# Patient Record
Sex: MALE | Race: WHITE | NOT HISPANIC OR LATINO | Employment: OTHER | URBAN - METROPOLITAN AREA
[De-identification: names, ages, dates, MRNs, and addresses within clinical notes are randomized per-mention and may not be internally consistent; named-entity substitution may affect disease eponyms.]

---

## 2020-06-30 ENCOUNTER — HOSPITAL ENCOUNTER (EMERGENCY)
Facility: HOSPITAL | Age: 50
Discharge: HOME | End: 2020-06-30
Attending: EMERGENCY MEDICINE
Payer: MEDICAID

## 2020-06-30 VITALS
WEIGHT: 150 LBS | TEMPERATURE: 98 F | HEIGHT: 68 IN | HEART RATE: 78 BPM | SYSTOLIC BLOOD PRESSURE: 128 MMHG | RESPIRATION RATE: 18 BRPM | BODY MASS INDEX: 22.73 KG/M2 | OXYGEN SATURATION: 99 % | DIASTOLIC BLOOD PRESSURE: 78 MMHG

## 2020-06-30 DIAGNOSIS — R20.2 PARESTHESIAS: Primary | ICD-10-CM

## 2020-06-30 DIAGNOSIS — M79.2 NEUROPATHIC PAIN: ICD-10-CM

## 2020-06-30 LAB
ALBUMIN SERPL BCP-MCNC: 4 G/DL (ref 3.5–5.2)
ALP SERPL-CCNC: 81 U/L (ref 55–135)
ALT SERPL W/O P-5'-P-CCNC: 18 U/L (ref 10–44)
ANION GAP SERPL CALC-SCNC: 8 MMOL/L (ref 8–16)
AST SERPL-CCNC: 17 U/L (ref 10–40)
BASOPHILS # BLD AUTO: 0.06 K/UL (ref 0–0.2)
BASOPHILS NFR BLD: 0.7 % (ref 0–1.9)
BILIRUB SERPL-MCNC: 1.1 MG/DL (ref 0.1–1)
BUN SERPL-MCNC: 17 MG/DL (ref 6–20)
CALCIUM SERPL-MCNC: 9.1 MG/DL (ref 8.7–10.5)
CHLORIDE SERPL-SCNC: 108 MMOL/L (ref 95–110)
CO2 SERPL-SCNC: 25 MMOL/L (ref 23–29)
CREAT SERPL-MCNC: 0.8 MG/DL (ref 0.5–1.4)
DIFFERENTIAL METHOD: ABNORMAL
EOSINOPHIL # BLD AUTO: 0.2 K/UL (ref 0–0.5)
EOSINOPHIL NFR BLD: 2.1 % (ref 0–8)
ERYTHROCYTE [DISTWIDTH] IN BLOOD BY AUTOMATED COUNT: 12.6 % (ref 11.5–14.5)
EST. GFR  (AFRICAN AMERICAN): >60 ML/MIN/1.73 M^2
EST. GFR  (NON AFRICAN AMERICAN): >60 ML/MIN/1.73 M^2
GLUCOSE SERPL-MCNC: 76 MG/DL (ref 70–110)
HCT VFR BLD AUTO: 46.5 % (ref 40–54)
HGB BLD-MCNC: 15.6 G/DL (ref 14–18)
IMM GRANULOCYTES # BLD AUTO: 0.02 K/UL (ref 0–0.04)
IMM GRANULOCYTES NFR BLD AUTO: 0.2 % (ref 0–0.5)
LACTATE SERPL-SCNC: 1.2 MMOL/L (ref 0.5–2.2)
LYMPHOCYTES # BLD AUTO: 2.7 K/UL (ref 1–4.8)
LYMPHOCYTES NFR BLD: 31.1 % (ref 18–48)
MCH RBC QN AUTO: 32 PG (ref 27–31)
MCHC RBC AUTO-ENTMCNC: 33.5 G/DL (ref 32–36)
MCV RBC AUTO: 95 FL (ref 82–98)
MONOCYTES # BLD AUTO: 0.8 K/UL (ref 0.3–1)
MONOCYTES NFR BLD: 8.9 % (ref 4–15)
NEUTROPHILS # BLD AUTO: 4.9 K/UL (ref 1.8–7.7)
NEUTROPHILS NFR BLD: 57 % (ref 38–73)
NRBC BLD-RTO: 0 /100 WBC
PLATELET # BLD AUTO: 266 K/UL (ref 150–350)
PMV BLD AUTO: 9.8 FL (ref 9.2–12.9)
POTASSIUM SERPL-SCNC: 4.2 MMOL/L (ref 3.5–5.1)
PROT SERPL-MCNC: 7 G/DL (ref 6–8.4)
RBC # BLD AUTO: 4.88 M/UL (ref 4.6–6.2)
SODIUM SERPL-SCNC: 141 MMOL/L (ref 136–145)
WBC # BLD AUTO: 8.62 K/UL (ref 3.9–12.7)

## 2020-06-30 PROCEDURE — 99284 EMERGENCY DEPT VISIT MOD MDM: CPT | Mod: ,,, | Performed by: EMERGENCY MEDICINE

## 2020-06-30 PROCEDURE — 83605 ASSAY OF LACTIC ACID: CPT

## 2020-06-30 PROCEDURE — 99284 EMERGENCY DEPT VISIT MOD MDM: CPT | Mod: 25

## 2020-06-30 PROCEDURE — 99284 PR EMERGENCY DEPT VISIT,LEVEL IV: ICD-10-PCS | Mod: ,,, | Performed by: EMERGENCY MEDICINE

## 2020-06-30 PROCEDURE — 80053 COMPREHEN METABOLIC PANEL: CPT

## 2020-06-30 PROCEDURE — 85025 COMPLETE CBC W/AUTO DIFF WBC: CPT

## 2020-06-30 RX ORDER — PREGABALIN 25 MG/1
25 CAPSULE ORAL 2 TIMES DAILY
Qty: 60 CAPSULE | Refills: 0 | Status: SHIPPED | OUTPATIENT
Start: 2020-06-30 | End: 2021-05-04 | Stop reason: CLARIF

## 2020-06-30 RX ORDER — GABAPENTIN 100 MG/1
100 CAPSULE ORAL 3 TIMES DAILY
Qty: 90 CAPSULE | Refills: 0 | Status: SHIPPED | OUTPATIENT
Start: 2020-06-30 | End: 2020-06-30 | Stop reason: CLARIF

## 2020-06-30 NOTE — ED NOTES
IV removed, catheter intact. Pressure dressing applied, bleeding controlled. Pt tolerated well.

## 2020-06-30 NOTE — ED NOTES
Pt. resting in bed in NAD. RR e/u. Continuous cardiac, BP, and O2 monitoring in progress. Pt. offered bathroom assistance and denies need at this time. Explanation of care/wait provided. Bed in low, locked position with rails up and call bell in reach. Pt wife at bedside. Will continue to monitor.

## 2020-06-30 NOTE — ED NOTES
Patient identifiers for Moreno Zee 49 y.o. male checked and correct.  Chief Complaint   Patient presents with    Extremity Weakness     both legs, numb and tingling for 3 months, legs went out and fell, denies bowel /bladder incontinence     Past Medical History:   Diagnosis Date    Arthritis     Obstructive sleep apnea 12/2011    CPAP @ 9cm/H2O     Allergies reported:   Review of patient's allergies indicates:   Allergen Reactions    No known drug allergies          LOC: Patient is awake, alert, and aware of environment with an appropriate affect. Patient is oriented x 4 and speaking appropriately.  APPEARANCE: Patient resting comfortably and in no acute distress. Patient is clean and well groomed, patient's clothing is properly fastened.  HEENT: Pt presents with surgical mask on   SKIN: The skin is warm and dry. Patient has normal skin turgor and moist mucus membranes.   MUSKULOSKELETAL: Patient is moving all extremities well, no obvious deformities noted. Pulses intact.   RESPIRATORY: Airway is open and patent. Respirations are spontaneous and non-labored with normal effort and rate.  CARDIAC: Patient has a normal rate and rhythm. 79 on cardiac monitor. No peripheral edema noted.   ABDOMEN: No distention noted. Soft and non-tender upon palpation.  NEUROLOGICAL: pupils 3mm, PERRL. Facial expression is symmetrical. Hand grasps are equal bilaterally. Normal sensation in all extremities when touched with finger.

## 2020-06-30 NOTE — DISCHARGE INSTRUCTIONS
Follow up with your primary doctor, as you require further testing for your symptoms. It was a pleasure caring for you.

## 2020-06-30 NOTE — ED PROVIDER NOTES
Encounter Date: 6/30/2020       History     Chief Complaint   Patient presents with    Extremity Weakness     both legs, numb and tingling for 3 months, legs went out and fell, denies bowel /bladder incontinence     HPI     This is a 49-year-old male with a history of smoking times a 16 pack year history, hypertension and sleep apnea who presents with acute onset of bilateral lower extremity numbness and tingling mostly in the feet, worse with ambulation and standing still, he notices it more in the morning whenever he gets up from sleeping.  He occasionally has paresthesias in his upper extremities but not currently.  He feels like there is less leg care on the left lower extremity.  Review of patient's allergies indicates:   Allergen Reactions    No known drug allergies      Past Medical History:   Diagnosis Date    Arthritis     Obstructive sleep apnea 12/2011    CPAP @ 9cm/H2O     Past Surgical History:   Procedure Laterality Date    LEG SURGERY       Family History   Problem Relation Age of Onset    Diabetes Maternal Aunt     Cancer Maternal Aunt     Diabetes Paternal Aunt     Cancer Paternal Aunt      Social History     Tobacco Use    Smoking status: Current Every Day Smoker     Packs/day: 2.00    Smokeless tobacco: Current User   Substance Use Topics    Alcohol use: No    Drug use: No     Review of Systems   Constitutional: Negative for chills, diaphoresis, fatigue and fever.   HENT: Negative for congestion, rhinorrhea and sore throat.    Eyes: Negative for visual disturbance.   Respiratory: Negative for cough, chest tightness and shortness of breath.    Cardiovascular: Negative for chest pain.   Gastrointestinal: Negative for abdominal pain, blood in stool, constipation, diarrhea and vomiting.   Genitourinary: Negative for dysuria, hematuria and urgency.   Musculoskeletal: Positive for back pain.        Back stiffness, no change recently to quality or character of pain     Skin: Negative for  rash.   Neurological: Positive for numbness. Negative for seizures and syncope.   Hematological: Does not bruise/bleed easily.   Psychiatric/Behavioral: Negative for agitation and hallucinations.       Physical Exam     Initial Vitals [06/30/20 0920]   BP Pulse Resp Temp SpO2   128/77 70 18 98.1 °F (36.7 °C) 98 %      MAP       --         Physical Exam  Gen: AxOx3, NAD, well nourished  Eye: EOMI, no scleral icterus, no periorbital edema or ecchymosis  Head: NCAT, no lesions  ENT: neck supple, no stridor, no masses  CVS: RRR, no m/r/g, distal pulses intact/symmetric  Pulm: CTAB, no wheezes, rales or rhonchi, no increased work of breathing  Abd: soft, nontender, nondistended, no organomegaly, no CVAT  Ext: no edema, lesions, rashes, or deformity; distal pulses are 2+ and symmetric.  Cap refill is less than 2 sec in the bilateral lower extremities.  Neuro: GCS15, moving all extremities, gait intact, there is 5/5 strength in bilateral upper and lower extremities.  Patellar reflexes are brisk and symmetric.  Psych: normal affect, cooperative    VARGAS: 1.4     ED Course   Procedures  Labs Reviewed   CBC W/ AUTO DIFFERENTIAL - Abnormal; Notable for the following components:       Result Value    Mean Corpuscular Hemoglobin 32.0 (*)     All other components within normal limits   COMPREHENSIVE METABOLIC PANEL - Abnormal; Notable for the following components:    Total Bilirubin 1.1 (*)     All other components within normal limits   LACTIC ACID, PLASMA          Imaging Results          X-Ray Lumbar Spine Ap And Lateral (Final result)  Result time 06/30/20 10:20:21    Final result by Bola Santiago MD (06/30/20 10:20:21)                 Impression:      No fracture subluxation.      Electronically signed by: Bola Santiago MD  Date:    06/30/2020  Time:    10:20             Narrative:    EXAMINATION:  XR LUMBAR SPINE AP AND LATERAL    CLINICAL HISTORY:  Back pain or radiculopathy, < 6 wks,  uncomplicated;    TECHNIQUE:  AP, lateral and spot images were performed of the lumbar spine.    COMPARISON:  03/23/2018    FINDINGS:  Mild dextroscoliosis thoracic lumbar junction.  Elevation left hemipelvis.  Osteophytes right lateral and anterior L3-L4, as well as anterior L2 through L4 levels.  Facet joint arthropathy lumbosacral junction.                                 Medical Decision Making:   History:   Old Medical Records: I decided to obtain old medical records.  Old Records Summarized: records from clinic visits.  Initial Assessment:   This is a 49-year-old male who presents with bilateral foot numbness, that is worse with exertion and standing up, associated with occasional hand paresthesias.  I suspect he has claudication versus neuropathy.  He currently appears well perfused, with normal cap refill, normal distal pulses, and his VARGAS is greater than 1.  I have low suspicion for critical stenosis or limb ischemia at this time.  It is possible that he is having progressive lumbar radiculopathy, but in the absence of any new pain, any bowel or bladder symptoms, do not think cauda equina or cord compression is present at this time.  Plan for basic labs to evaluate for exacerbations of neuropathic pain such as electrolyte abnormality, anemia, or lactic acidosis.  If these are normal, I think the patient can be referred back to his primary care doctor for further testing.                                 Clinical Impression:       ICD-10-CM ICD-9-CM   1. Paresthesias  R20.2 782.0   2. Neuropathic pain  M79.2 729.2             ED Disposition Condition    Discharge Stable        ED Prescriptions     Medication Sig Dispense Start Date End Date Auth. Provider    gabapentin (NEURONTIN) 100 MG capsule  (Status: Discontinued) Take 1 capsule (100 mg total) by mouth 3 (three) times daily. 90 capsule 6/30/2020 6/30/2020 Cesilia Barney MD    pregabalin (LYRICA) 25 MG capsule Take 1 capsule (25 mg total) by mouth 2  (two) times daily. 60 capsule 6/30/2020 7/30/2020 Cesilia Barney MD        Follow-up Information     Follow up With Specialties Details Why Contact Info    [facility] Emergency Medicine Schedule an appointment as soon as possible for a visit   [address] [facility] [address]  [phone]                                     Cesilia Barney MD  06/30/20 0640

## 2020-07-01 ENCOUNTER — TELEPHONE (OUTPATIENT)
Dept: NEUROLOGY | Facility: CLINIC | Age: 50
End: 2020-07-01

## 2021-05-04 ENCOUNTER — HOSPITAL ENCOUNTER (EMERGENCY)
Facility: HOSPITAL | Age: 51
Discharge: HOME | End: 2021-05-04
Attending: EMERGENCY MEDICINE
Payer: MEDICAID

## 2021-05-04 VITALS
HEART RATE: 62 BPM | RESPIRATION RATE: 16 BRPM | DIASTOLIC BLOOD PRESSURE: 79 MMHG | TEMPERATURE: 98 F | SYSTOLIC BLOOD PRESSURE: 158 MMHG | BODY MASS INDEX: 22.81 KG/M2 | OXYGEN SATURATION: 100 % | WEIGHT: 150 LBS

## 2021-05-04 DIAGNOSIS — E86.0 DEHYDRATION: Primary | ICD-10-CM

## 2021-05-04 DIAGNOSIS — R55 SYNCOPE: ICD-10-CM

## 2021-05-04 LAB
ALBUMIN SERPL BCP-MCNC: 3.8 G/DL (ref 3.5–5.2)
ALP SERPL-CCNC: 58 U/L (ref 55–135)
ALT SERPL W/O P-5'-P-CCNC: 26 U/L (ref 10–44)
ANION GAP SERPL CALC-SCNC: 7 MMOL/L (ref 8–16)
AST SERPL-CCNC: 18 U/L (ref 10–40)
BASOPHILS # BLD AUTO: 0.03 K/UL (ref 0–0.2)
BASOPHILS NFR BLD: 0.4 % (ref 0–1.9)
BILIRUB SERPL-MCNC: 0.3 MG/DL (ref 0.1–1)
BILIRUB UR QL STRIP: NEGATIVE
BNP SERPL-MCNC: 13 PG/ML (ref 0–99)
BUN SERPL-MCNC: 20 MG/DL (ref 6–20)
CALCIUM SERPL-MCNC: 8.9 MG/DL (ref 8.7–10.5)
CHLORIDE SERPL-SCNC: 107 MMOL/L (ref 95–110)
CLARITY UR: CLEAR
CO2 SERPL-SCNC: 28 MMOL/L (ref 23–29)
COLOR UR: YELLOW
CREAT SERPL-MCNC: 0.8 MG/DL (ref 0.5–1.4)
D DIMER PPP IA.FEU-MCNC: 0.26 MG/L FEU
DIFFERENTIAL METHOD: NORMAL
EOSINOPHIL # BLD AUTO: 0.1 K/UL (ref 0–0.5)
EOSINOPHIL NFR BLD: 2 % (ref 0–8)
ERYTHROCYTE [DISTWIDTH] IN BLOOD BY AUTOMATED COUNT: 12.4 % (ref 11.5–14.5)
EST. GFR  (AFRICAN AMERICAN): >60 ML/MIN/1.73 M^2
EST. GFR  (NON AFRICAN AMERICAN): >60 ML/MIN/1.73 M^2
GLUCOSE SERPL-MCNC: 105 MG/DL (ref 70–110)
GLUCOSE UR QL STRIP: NEGATIVE
HCT VFR BLD AUTO: 43 % (ref 40–54)
HGB BLD-MCNC: 14.2 G/DL (ref 14–18)
HGB UR QL STRIP: NEGATIVE
IMM GRANULOCYTES # BLD AUTO: 0.02 K/UL (ref 0–0.04)
IMM GRANULOCYTES NFR BLD AUTO: 0.3 % (ref 0–0.5)
KETONES UR QL STRIP: NEGATIVE
LEUKOCYTE ESTERASE UR QL STRIP: NEGATIVE
LYMPHOCYTES # BLD AUTO: 2.4 K/UL (ref 1–4.8)
LYMPHOCYTES NFR BLD: 35.2 % (ref 18–48)
MAGNESIUM SERPL-MCNC: 2 MG/DL (ref 1.6–2.6)
MCH RBC QN AUTO: 29.6 PG (ref 27–31)
MCHC RBC AUTO-ENTMCNC: 33 G/DL (ref 32–36)
MCV RBC AUTO: 90 FL (ref 82–98)
MONOCYTES # BLD AUTO: 0.4 K/UL (ref 0.3–1)
MONOCYTES NFR BLD: 5.5 % (ref 4–15)
NEUTROPHILS # BLD AUTO: 3.9 K/UL (ref 1.8–7.7)
NEUTROPHILS NFR BLD: 56.6 % (ref 38–73)
NITRITE UR QL STRIP: NEGATIVE
NRBC BLD-RTO: 0 /100 WBC
PH UR STRIP: 6 [PH] (ref 5–8)
PLATELET # BLD AUTO: 239 K/UL (ref 150–450)
PMV BLD AUTO: 9.7 FL (ref 9.2–12.9)
POTASSIUM SERPL-SCNC: 4.5 MMOL/L (ref 3.5–5.1)
PROT SERPL-MCNC: 6.5 G/DL (ref 6–8.4)
PROT UR QL STRIP: NEGATIVE
RBC # BLD AUTO: 4.79 M/UL (ref 4.6–6.2)
SODIUM SERPL-SCNC: 142 MMOL/L (ref 136–145)
SP GR UR STRIP: >=1.03 (ref 1–1.03)
TROPONIN I SERPL DL<=0.01 NG/ML-MCNC: 0.01 NG/ML (ref 0–0.03)
TROPONIN I SERPL DL<=0.01 NG/ML-MCNC: 0.01 NG/ML (ref 0–0.03)
TSH SERPL DL<=0.005 MIU/L-ACNC: 0.54 UIU/ML (ref 0.4–4)
URN SPEC COLLECT METH UR: ABNORMAL
UROBILINOGEN UR STRIP-ACNC: 1 EU/DL
WBC # BLD AUTO: 6.91 K/UL (ref 3.9–12.7)

## 2021-05-04 PROCEDURE — 99285 EMERGENCY DEPT VISIT HI MDM: CPT | Mod: 25

## 2021-05-04 PROCEDURE — 93005 ELECTROCARDIOGRAM TRACING: CPT

## 2021-05-04 PROCEDURE — 96361 HYDRATE IV INFUSION ADD-ON: CPT

## 2021-05-04 PROCEDURE — 83735 ASSAY OF MAGNESIUM: CPT | Performed by: EMERGENCY MEDICINE

## 2021-05-04 PROCEDURE — 80053 COMPREHEN METABOLIC PANEL: CPT | Performed by: EMERGENCY MEDICINE

## 2021-05-04 PROCEDURE — 36415 COLL VENOUS BLD VENIPUNCTURE: CPT | Performed by: EMERGENCY MEDICINE

## 2021-05-04 PROCEDURE — 96360 HYDRATION IV INFUSION INIT: CPT

## 2021-05-04 PROCEDURE — 85025 COMPLETE CBC W/AUTO DIFF WBC: CPT | Performed by: EMERGENCY MEDICINE

## 2021-05-04 PROCEDURE — 84484 ASSAY OF TROPONIN QUANT: CPT | Performed by: EMERGENCY MEDICINE

## 2021-05-04 PROCEDURE — 81003 URINALYSIS AUTO W/O SCOPE: CPT | Performed by: EMERGENCY MEDICINE

## 2021-05-04 PROCEDURE — 84443 ASSAY THYROID STIM HORMONE: CPT | Performed by: EMERGENCY MEDICINE

## 2021-05-04 PROCEDURE — 83880 ASSAY OF NATRIURETIC PEPTIDE: CPT | Performed by: EMERGENCY MEDICINE

## 2021-05-04 PROCEDURE — 85379 FIBRIN DEGRADATION QUANT: CPT | Performed by: EMERGENCY MEDICINE

## 2021-05-04 PROCEDURE — 25000003 PHARM REV CODE 250: Performed by: EMERGENCY MEDICINE

## 2021-05-04 RX ADMIN — SODIUM CHLORIDE 1000 ML: 0.9 INJECTION, SOLUTION INTRAVENOUS at 06:05

## 2021-05-04 RX ADMIN — SODIUM CHLORIDE 1000 ML: 0.9 INJECTION, SOLUTION INTRAVENOUS at 02:05

## 2021-05-10 ENCOUNTER — PATIENT MESSAGE (OUTPATIENT)
Dept: RESEARCH | Facility: HOSPITAL | Age: 51
End: 2021-05-10

## 2021-07-22 ENCOUNTER — OFFICE VISIT (OUTPATIENT)
Dept: URGENT CARE | Facility: CLINIC | Age: 51
End: 2021-07-22
Payer: MEDICAID

## 2021-07-22 VITALS
OXYGEN SATURATION: 96 % | WEIGHT: 173 LBS | SYSTOLIC BLOOD PRESSURE: 117 MMHG | DIASTOLIC BLOOD PRESSURE: 74 MMHG | HEIGHT: 70 IN | HEART RATE: 80 BPM | TEMPERATURE: 98 F | BODY MASS INDEX: 24.77 KG/M2 | RESPIRATION RATE: 16 BRPM

## 2021-07-22 DIAGNOSIS — L03.116 CELLULITIS OF LEFT LOWER EXTREMITY: ICD-10-CM

## 2021-07-22 DIAGNOSIS — L02.91 ABSCESS: Primary | ICD-10-CM

## 2021-07-22 PROCEDURE — 99204 OFFICE O/P NEW MOD 45 MIN: CPT | Mod: S$GLB,,, | Performed by: NURSE PRACTITIONER

## 2021-07-22 PROCEDURE — 99204 PR OFFICE/OUTPT VISIT, NEW, LEVL IV, 45-59 MIN: ICD-10-PCS | Mod: S$GLB,,, | Performed by: NURSE PRACTITIONER

## 2021-07-22 RX ORDER — CEPHALEXIN 500 MG/1
500 CAPSULE ORAL 4 TIMES DAILY
Qty: 20 CAPSULE | Refills: 0 | Status: SHIPPED | OUTPATIENT
Start: 2021-07-22 | End: 2021-07-27

## 2021-07-22 RX ORDER — SULFAMETHOXAZOLE AND TRIMETHOPRIM 800; 160 MG/1; MG/1
1 TABLET ORAL 2 TIMES DAILY
Qty: 10 TABLET | Refills: 0 | Status: SHIPPED | OUTPATIENT
Start: 2021-07-22 | End: 2021-07-27

## 2021-07-22 RX ORDER — MUPIROCIN 20 MG/G
OINTMENT TOPICAL DAILY
Qty: 22 G | Refills: 0 | Status: SHIPPED | OUTPATIENT
Start: 2021-07-22 | End: 2021-07-29

## 2021-08-27 DIAGNOSIS — M54.2 CERVICALGIA: Primary | ICD-10-CM

## 2021-09-03 ENCOUNTER — HOSPITAL ENCOUNTER (OUTPATIENT)
Dept: RADIOLOGY | Facility: HOSPITAL | Age: 51
Discharge: HOME | End: 2021-09-03
Attending: FAMILY MEDICINE
Payer: MEDICAID

## 2021-09-03 DIAGNOSIS — M54.2 CERVICALGIA: ICD-10-CM

## 2021-09-03 PROCEDURE — 72040 X-RAY EXAM NECK SPINE 2-3 VW: CPT | Mod: TC,PO

## 2022-03-17 DIAGNOSIS — M47.26 OTHER SPONDYLOSIS WITH RADICULOPATHY, LUMBAR REGION: Primary | ICD-10-CM

## 2022-03-17 DIAGNOSIS — M47.896 OTHER OSTEOARTHRITIS OF SPINE, LUMBAR REGION: ICD-10-CM

## 2022-03-26 ENCOUNTER — HOSPITAL ENCOUNTER (OUTPATIENT)
Dept: RADIOLOGY | Facility: HOSPITAL | Age: 52
Discharge: HOME | End: 2022-03-26
Attending: PHYSICAL MEDICINE & REHABILITATION
Payer: MEDICAID

## 2022-03-26 DIAGNOSIS — M47.26 OTHER SPONDYLOSIS WITH RADICULOPATHY, LUMBAR REGION: ICD-10-CM

## 2022-03-26 DIAGNOSIS — M47.896 OTHER OSTEOARTHRITIS OF SPINE, LUMBAR REGION: ICD-10-CM

## 2022-03-26 PROCEDURE — 72148 MRI LUMBAR SPINE W/O DYE: CPT | Mod: TC

## 2022-03-26 PROCEDURE — 72110 X-RAY EXAM L-2 SPINE 4/>VWS: CPT | Mod: 26,,, | Performed by: RADIOLOGY

## 2022-03-26 PROCEDURE — 72110 XR LUMBAR SPINE COMPLETE 5 VIEW: ICD-10-PCS | Mod: 26,,, | Performed by: RADIOLOGY

## 2022-03-26 PROCEDURE — 72110 X-RAY EXAM L-2 SPINE 4/>VWS: CPT | Mod: TC,FY

## 2022-03-26 PROCEDURE — 72148 MRI LUMBAR SPINE WITHOUT CONTRAST: ICD-10-PCS | Mod: 26,,, | Performed by: RADIOLOGY

## 2022-03-26 PROCEDURE — 72148 MRI LUMBAR SPINE W/O DYE: CPT | Mod: 26,,, | Performed by: RADIOLOGY

## 2022-04-08 ENCOUNTER — HOSPITAL ENCOUNTER (EMERGENCY)
Facility: HOSPITAL | Age: 52
Discharge: HOME | End: 2022-04-08
Attending: EMERGENCY MEDICINE
Payer: MEDICAID

## 2022-04-08 VITALS
HEIGHT: 70 IN | OXYGEN SATURATION: 98 % | RESPIRATION RATE: 18 BRPM | TEMPERATURE: 98 F | WEIGHT: 175 LBS | DIASTOLIC BLOOD PRESSURE: 75 MMHG | HEART RATE: 69 BPM | BODY MASS INDEX: 25.05 KG/M2 | SYSTOLIC BLOOD PRESSURE: 124 MMHG

## 2022-04-08 DIAGNOSIS — H57.89 EYE SWELLING, BILATERAL: Primary | ICD-10-CM

## 2022-04-08 LAB — POCT GLUCOSE: 126 MG/DL (ref 70–110)

## 2022-04-08 PROCEDURE — 82962 GLUCOSE BLOOD TEST: CPT

## 2022-04-08 PROCEDURE — 99284 EMERGENCY DEPT VISIT MOD MDM: CPT

## 2022-04-08 PROCEDURE — 25000003 PHARM REV CODE 250: Performed by: EMERGENCY MEDICINE

## 2022-04-08 RX ORDER — HYDROCODONE BITARTRATE AND ACETAMINOPHEN 10; 325 MG/1; MG/1
1 TABLET ORAL
Status: COMPLETED | OUTPATIENT
Start: 2022-04-08 | End: 2022-04-08

## 2022-04-08 RX ORDER — AMOXICILLIN AND CLAVULANATE POTASSIUM 875; 125 MG/1; MG/1
1 TABLET, FILM COATED ORAL 2 TIMES DAILY
Qty: 14 TABLET | Refills: 0 | Status: SHIPPED | OUTPATIENT
Start: 2022-04-08 | End: 2022-11-09

## 2022-04-08 RX ORDER — AMOXICILLIN AND CLAVULANATE POTASSIUM 875; 125 MG/1; MG/1
1 TABLET, FILM COATED ORAL
Status: COMPLETED | OUTPATIENT
Start: 2022-04-08 | End: 2022-04-08

## 2022-04-08 RX ORDER — CIPROFLOXACIN HYDROCHLORIDE 3 MG/ML
2 SOLUTION/ DROPS OPHTHALMIC ONCE
Status: COMPLETED | OUTPATIENT
Start: 2022-04-08 | End: 2022-04-08

## 2022-04-08 RX ORDER — HYDROCODONE BITARTRATE AND ACETAMINOPHEN 5; 325 MG/1; MG/1
1 TABLET ORAL EVERY 4 HOURS PRN
Qty: 8 TABLET | Refills: 0 | Status: SHIPPED | OUTPATIENT
Start: 2022-04-08 | End: 2022-04-18

## 2022-04-08 RX ADMIN — HYDROCODONE BITARTRATE AND ACETAMINOPHEN 1 TABLET: 10; 325 TABLET ORAL at 05:04

## 2022-04-08 RX ADMIN — AMOXICILLIN AND CLAVULANATE POTASSIUM 1 TABLET: 875; 125 TABLET, FILM COATED ORAL at 05:04

## 2022-04-08 RX ADMIN — CIPROFLOXACIN HYDROCHLORIDE 2 DROP: 3 SOLUTION/ DROPS OPHTHALMIC at 06:04

## 2022-04-08 NOTE — DISCHARGE INSTRUCTIONS
Use ciprofloxacin drops 3 times a day for the next 5 days.  Take oral antibiotics as prescribed.  Return to the ER for worsening symptoms such as worsening swelling or pain.  It is difficult to determine the exact cause of your eye swelling in the emergency room but it could be due to a viral infection or a bacterial infection or a reaction from the recent work at your home.  If it is a bacterial infection will not improve without the prescribed medications.  Please return for any concerns.

## 2022-04-08 NOTE — ED PROVIDER NOTES
Encounter Date: 4/8/2022       History     Chief Complaint   Patient presents with    Eye Pain     Bilateral periorbital swelling with crusting     51-year-old male with no significant medical history presents with 2 days of bilateral periorbital swelling and drainage.  Patient reports he is doing work on his home thinks it might be the dust.  He has no foreign body sensation and reports no ocular trauma.  He has no other complaints.  No relief at home with over-the-counter pain medications, oral Benadryl or eyedrops.  No previous history of this.  To his knowledge he has not been exposed to anyone with pinkeye.  He woke up this morning he had a significant amount of crusted drainage in his eyelashes were stuck together.  No visual changes.        Review of patient's allergies indicates:   Allergen Reactions    No known drug allergies      Past Medical History:   Diagnosis Date    Arthritis     Obstructive sleep apnea 12/2011    CPAP @ 9cm/H2O     Past Surgical History:   Procedure Laterality Date    LEG SURGERY       Family History   Problem Relation Age of Onset    Diabetes Maternal Aunt     Cancer Maternal Aunt     Diabetes Paternal Aunt     Cancer Paternal Aunt      Social History     Tobacco Use    Smoking status: Current Every Day Smoker     Packs/day: 2.00    Smokeless tobacco: Current User   Substance Use Topics    Alcohol use: No    Drug use: No     Review of Systems   Constitutional: Negative for fever.   Eyes: Positive for pain and discharge.   Hematological: Negative for adenopathy.       Physical Exam     Initial Vitals [04/08/22 1707]   BP Pulse Resp Temp SpO2   100/66 75 16 97.5 °F (36.4 °C) 98 %      MAP       --         Physical Exam    Nursing note and vitals reviewed.  Constitutional: He appears well-developed and well-nourished. He is not diaphoretic.  Non-toxic appearance. He does not have a sickly appearance. He does not appear ill. No distress.   HENT:   Head: Normocephalic and  atraumatic.   Eyes: Conjunctivae and EOM are normal. Pupils are equal, round, and reactive to light. Right eye exhibits discharge. Right eye exhibits no chemosis. Left eye exhibits discharge. Left eye exhibits no chemosis. Right conjunctiva is not injected. Right conjunctiva has no hemorrhage. Left conjunctiva is not injected.   Bilateral periorbital lid swelling.  There is evidence of drainage from both lateral canthi.  Conjunctiva appear clear and are not injected.  Bilateral lid tenderness.   Neck: Neck supple.   Normal range of motion.  Pulmonary/Chest: No respiratory distress.   Musculoskeletal:         General: Normal range of motion.      Cervical back: Normal range of motion and neck supple. No rigidity. Normal range of motion.     Neurological: He is alert and oriented to person, place, and time.   Skin: Skin is warm and dry. No rash noted.   Psychiatric: He has a normal mood and affect. His behavior is normal. Judgment and thought content normal.         ED Course   Procedures  Labs Reviewed   POCT GLUCOSE - Abnormal; Notable for the following components:       Result Value    POCT Glucose 126 (*)     All other components within normal limits   POCT GLUCOSE MONITORING CONTINUOUS          Imaging Results    None          Medications   amoxicillin-clavulanate 875-125mg per tablet 1 tablet (1 tablet Oral Given 4/8/22 1753)   HYDROcodone-acetaminophen  mg per tablet 1 tablet (1 tablet Oral Given 4/8/22 1753)   ciprofloxacin HCl 0.3 % ophthalmic solution 2 drop (2 drops Both Eyes Given 4/8/22 1804)                 ED Course as of 04/08/22 1851 Fri Apr 08, 2022 1718 SpO2: 98 % [EF]   1718 Resp: 16 [EF]   1718 Pulse: 75 [EF]   1718 Temp src: Oral [EF]   1718 Temp: 97.5 °F (36.4 °C) [EF]   1718 BP: 100/66 [EF]   1736 Patient presents to the emergency room with bilateral eye swelling and pain with discharge.  He reports doing work on his house and being exposed to lot of sawdust but denies any foreign  body sensation.  Possibly allergic or viral conjunctivitis although conjunctiva look clear.  I think less likely bacterial conjunctivitis but he does have colored drainage.  He will be treated with oral antibiotics and antibiotic eyedrops.  I think bilateral periorbital cellulitis is less likely as well but he will be started on oral antibiotics.  [EF]   1805 POCT Glucose(!): 126 [EF]      ED Course User Index  [EF] Julián Bartlett MD             Clinical Impression:   Final diagnoses:  [H57.89] Eye swelling, bilateral (Primary)          ED Disposition Condition    Discharge Stable        ED Prescriptions     Medication Sig Dispense Start Date End Date Auth. Provider    amoxicillin-clavulanate 875-125mg (AUGMENTIN) 875-125 mg per tablet Take 1 tablet by mouth 2 (two) times daily. 14 tablet 4/8/2022  Julián Bartlett MD    HYDROcodone-acetaminophen (NORCO) 5-325 mg per tablet Take 1 tablet by mouth every 4 (four) hours as needed for Pain. 8 tablet 4/8/2022 4/18/2022 Julián Bartlett MD        Follow-up Information     Follow up With Specialties Details Why Contact Info    [facility] Emergency Dept Emergency Medicine  As needed, If symptoms worsen [address] [place] [id number] [phone]           Julián Bartlett MD  04/08/22 1092

## 2022-07-14 ENCOUNTER — OFFICE VISIT (OUTPATIENT)
Dept: URGENT CARE | Facility: CLINIC | Age: 52
End: 2022-07-14
Payer: MEDICAID

## 2022-07-14 VITALS
SYSTOLIC BLOOD PRESSURE: 92 MMHG | WEIGHT: 178.63 LBS | BODY MASS INDEX: 26.46 KG/M2 | HEART RATE: 74 BPM | DIASTOLIC BLOOD PRESSURE: 62 MMHG | TEMPERATURE: 98 F | HEIGHT: 69 IN | RESPIRATION RATE: 20 BRPM | OXYGEN SATURATION: 98 %

## 2022-07-14 DIAGNOSIS — J32.9 SINUSITIS, UNSPECIFIED CHRONICITY, UNSPECIFIED LOCATION: Primary | ICD-10-CM

## 2022-07-14 DIAGNOSIS — H65.93 MIDDLE EAR EFFUSION, BILATERAL: ICD-10-CM

## 2022-07-14 PROCEDURE — 1160F PR REVIEW ALL MEDS BY PRESCRIBER/CLIN PHARMACIST DOCUMENTED: ICD-10-PCS | Mod: CPTII,S$GLB,, | Performed by: NURSE PRACTITIONER

## 2022-07-14 PROCEDURE — 1160F RVW MEDS BY RX/DR IN RCRD: CPT | Mod: CPTII,S$GLB,, | Performed by: NURSE PRACTITIONER

## 2022-07-14 PROCEDURE — 1159F MED LIST DOCD IN RCRD: CPT | Mod: CPTII,S$GLB,, | Performed by: NURSE PRACTITIONER

## 2022-07-14 PROCEDURE — 3008F BODY MASS INDEX DOCD: CPT | Mod: CPTII,S$GLB,, | Performed by: NURSE PRACTITIONER

## 2022-07-14 PROCEDURE — 99203 PR OFFICE/OUTPT VISIT, NEW, LEVL III, 30-44 MIN: ICD-10-PCS | Mod: S$GLB,,, | Performed by: NURSE PRACTITIONER

## 2022-07-14 PROCEDURE — 3008F PR BODY MASS INDEX (BMI) DOCUMENTED: ICD-10-PCS | Mod: CPTII,S$GLB,, | Performed by: NURSE PRACTITIONER

## 2022-07-14 PROCEDURE — 3078F DIAST BP <80 MM HG: CPT | Mod: CPTII,S$GLB,, | Performed by: NURSE PRACTITIONER

## 2022-07-14 PROCEDURE — 3074F SYST BP LT 130 MM HG: CPT | Mod: CPTII,S$GLB,, | Performed by: NURSE PRACTITIONER

## 2022-07-14 PROCEDURE — 1159F PR MEDICATION LIST DOCUMENTED IN MEDICAL RECORD: ICD-10-PCS | Mod: CPTII,S$GLB,, | Performed by: NURSE PRACTITIONER

## 2022-07-14 PROCEDURE — 3078F PR MOST RECENT DIASTOLIC BLOOD PRESSURE < 80 MM HG: ICD-10-PCS | Mod: CPTII,S$GLB,, | Performed by: NURSE PRACTITIONER

## 2022-07-14 PROCEDURE — 3074F PR MOST RECENT SYSTOLIC BLOOD PRESSURE < 130 MM HG: ICD-10-PCS | Mod: CPTII,S$GLB,, | Performed by: NURSE PRACTITIONER

## 2022-07-14 PROCEDURE — 99203 OFFICE O/P NEW LOW 30 MIN: CPT | Mod: S$GLB,,, | Performed by: NURSE PRACTITIONER

## 2022-07-14 RX ORDER — HYDROCODONE BITARTRATE AND ACETAMINOPHEN 5; 325 MG/1; MG/1
1 TABLET ORAL EVERY 4 HOURS PRN
COMMUNITY
Start: 2022-04-10

## 2022-07-14 RX ORDER — GABAPENTIN 300 MG/1
600 CAPSULE ORAL 3 TIMES DAILY
COMMUNITY
Start: 2022-04-22

## 2022-07-14 RX ORDER — CETIRIZINE HYDROCHLORIDE 10 MG/1
10 TABLET ORAL DAILY
Qty: 30 TABLET | Refills: 0 | Status: SHIPPED | OUTPATIENT
Start: 2022-07-14 | End: 2022-11-09

## 2022-07-14 RX ORDER — AZITHROMYCIN 250 MG/1
TABLET, FILM COATED ORAL
Qty: 6 TABLET | Refills: 0 | Status: SHIPPED | OUTPATIENT
Start: 2022-07-14 | End: 2022-11-09

## 2022-07-14 RX ORDER — TIZANIDINE 4 MG/1
4-8 TABLET ORAL 4 TIMES DAILY PRN
COMMUNITY
Start: 2022-06-30

## 2022-07-14 RX ORDER — FLUTICASONE PROPIONATE 50 MCG
1 SPRAY, SUSPENSION (ML) NASAL DAILY
Qty: 15.8 ML | Refills: 0 | Status: SHIPPED | OUTPATIENT
Start: 2022-07-14 | End: 2022-11-09

## 2022-07-14 RX ORDER — GUAIFENESIN 600 MG/1
1200 TABLET, EXTENDED RELEASE ORAL 2 TIMES DAILY
Qty: 40 TABLET | Refills: 0 | Status: SHIPPED | OUTPATIENT
Start: 2022-07-14 | End: 2022-07-24

## 2022-07-15 NOTE — PATIENT INSTRUCTIONS
Increase fluid intake significantly to help thin secretions.  Avoid caffeine and alcohol as is to hydrate chewing will cause mucus to thicken.  No smoking.  Guaifenesin twice a day for 10 days to help thin secretions.  Azithromycin as prescribed.  Flonase daily until prescription is completed.  Zyrtec daily until prescription is completed.

## 2022-07-15 NOTE — PROGRESS NOTES
"Subjective:       Patient ID: Moreno Zee is a 51 y.o. male.    Vitals:  height is 5' 9" (1.753 m) and weight is 81 kg (178 lb 9.6 oz). His temperature is 97.5 °F (36.4 °C). His blood pressure is 92/62 and his pulse is 74. His respiration is 20 and oxygen saturation is 98%.     Chief Complaint: No chief complaint on file.    Bilateral Ear pain (pt feels like he has water in his ears).  Patient had COVID approximately 1 month ago and has residual sinus symptoms with bilateral ear fullness worsening last few days with sensation water in his ears and decreased hearing      Constitution: Positive for fatigue. Negative for chills and fever.   HENT: Positive for congestion, sinus pain and sinus pressure. Negative for ear pain (Bilateral fullness) and ear discharge.    Respiratory: Positive for cough. Negative for chest tightness and shortness of breath.    Gastrointestinal: Negative for nausea, vomiting and diarrhea.       Objective:      Physical Exam   Constitutional: He is oriented to person, place, and time. He appears well-developed.  Non-toxic appearance. He does not appear ill. No distress.   HENT:   Head: Normocephalic and atraumatic.   Ears:   Right Ear: External ear and ear canal normal. Tympanic membrane is bulging. A middle ear effusion is present.   Left Ear: Ear canal normal. Tympanic membrane is bulging. A middle ear effusion is present.   Nose: Rhinorrhea and congestion present. Right sinus exhibits maxillary sinus tenderness and frontal sinus tenderness. Left sinus exhibits maxillary sinus tenderness and frontal sinus tenderness.   Mouth/Throat: Oropharynx is clear and moist. Mucous membranes are moist. No oropharyngeal exudate or posterior oropharyngeal erythema.   Eyes: Conjunctivae and EOM are normal. Right eye exhibits no discharge. Left eye exhibits no discharge.   Neck: Neck supple. No neck rigidity present.   Cardiovascular: Normal rate, regular rhythm and normal heart sounds. "   Pulmonary/Chest: Effort normal and breath sounds normal. No respiratory distress. He has no wheezes. He has no rhonchi.   Abdominal: Normal appearance.   Musculoskeletal:      Cervical back: He exhibits no tenderness.   Lymphadenopathy:     He has no cervical adenopathy.   Neurological: no focal deficit. He is alert and oriented to person, place, and time.   Skin: Skin is warm, dry and not diaphoretic. Capillary refill takes 2 to 3 seconds.   Psychiatric: His behavior is normal. Mood normal.   Nursing note and vitals reviewed.        Assessment:       1. Sinusitis, unspecified chronicity, unspecified location    2. Middle ear effusion, bilateral          Plan:         Sinusitis, unspecified chronicity, unspecified location  -     fluticasone propionate (FLONASE) 50 mcg/actuation nasal spray; 1 spray (50 mcg total) by Each Nostril route once daily.  Dispense: 15.8 mL; Refill: 0  -     cetirizine (ZYRTEC) 10 MG tablet; Take 1 tablet (10 mg total) by mouth once daily.  Dispense: 30 tablet; Refill: 0  -     azithromycin (Z-SILVER) 250 MG tablet; Take 2 tablets by mouth on day 1; Take 1 tablet by mouth on days 2-5  Dispense: 6 tablet; Refill: 0  -     guaiFENesin (MUCINEX) 600 mg 12 hr tablet; Take 2 tablets (1,200 mg total) by mouth 2 (two) times daily. for 10 days  Dispense: 40 tablet; Refill: 0    Middle ear effusion, bilateral  -     fluticasone propionate (FLONASE) 50 mcg/actuation nasal spray; 1 spray (50 mcg total) by Each Nostril route once daily.  Dispense: 15.8 mL; Refill: 0  -     cetirizine (ZYRTEC) 10 MG tablet; Take 1 tablet (10 mg total) by mouth once daily.  Dispense: 30 tablet; Refill: 0  -     azithromycin (Z-SILVER) 250 MG tablet; Take 2 tablets by mouth on day 1; Take 1 tablet by mouth on days 2-5  Dispense: 6 tablet; Refill: 0  -     guaiFENesin (MUCINEX) 600 mg 12 hr tablet; Take 2 tablets (1,200 mg total) by mouth 2 (two) times daily. for 10 days  Dispense: 40 tablet; Refill: 0    Increase fluid intake  significantly to help thin secretions.  Avoid caffeine and alcohol as is to hydrate chewing will cause mucus to thicken.  No smoking.  Guaifenesin twice a day for 10 days to help thin secretions.  Azithromycin as prescribed.  Flonase daily until prescription is completed.  Zyrtec daily until prescription is completed.

## 2022-08-16 DIAGNOSIS — R07.9 CHEST PAIN, UNSPECIFIED: Primary | ICD-10-CM

## 2022-08-26 ENCOUNTER — HOSPITAL ENCOUNTER (OUTPATIENT)
Dept: PREADMISSION TESTING | Facility: HOSPITAL | Age: 52
Discharge: HOME | End: 2022-08-26
Attending: NURSE PRACTITIONER
Payer: MEDICAID

## 2022-08-26 ENCOUNTER — HOSPITAL ENCOUNTER (OUTPATIENT)
Dept: RADIOLOGY | Facility: HOSPITAL | Age: 52
Discharge: HOME | End: 2022-08-26
Attending: NURSE PRACTITIONER
Payer: MEDICAID

## 2022-08-26 DIAGNOSIS — R07.9 CHEST PAIN, UNSPECIFIED: ICD-10-CM

## 2022-08-26 PROCEDURE — 93010 ELECTROCARDIOGRAM REPORT: CPT | Mod: ,,, | Performed by: SPECIALIST

## 2022-08-26 PROCEDURE — 93005 ELECTROCARDIOGRAM TRACING: CPT | Performed by: SPECIALIST

## 2022-08-26 PROCEDURE — 93010 EKG 12-LEAD: ICD-10-PCS | Mod: ,,, | Performed by: SPECIALIST

## 2022-08-26 PROCEDURE — 71046 X-RAY EXAM CHEST 2 VIEWS: CPT | Mod: TC

## 2022-09-06 DIAGNOSIS — R06.00 DYSPNEA: Primary | ICD-10-CM

## 2022-09-13 DIAGNOSIS — S22.069S CLOSED FRACTURE OF SEVENTH THORACIC VERTEBRA, UNSPECIFIED FRACTURE MORPHOLOGY, SEQUELA: Primary | ICD-10-CM

## 2022-10-05 ENCOUNTER — HOSPITAL ENCOUNTER (OUTPATIENT)
Dept: PULMONOLOGY | Facility: HOSPITAL | Age: 52
Discharge: HOME | End: 2022-10-05
Attending: NURSE PRACTITIONER
Payer: MEDICAID

## 2022-10-05 ENCOUNTER — HOSPITAL ENCOUNTER (OUTPATIENT)
Dept: RADIOLOGY | Facility: HOSPITAL | Age: 52
Discharge: HOME | End: 2022-10-05
Attending: STUDENT IN AN ORGANIZED HEALTH CARE EDUCATION/TRAINING PROGRAM
Payer: MEDICAID

## 2022-10-05 DIAGNOSIS — R06.00 DYSPNEA: ICD-10-CM

## 2022-10-05 DIAGNOSIS — S22.069S CLOSED FRACTURE OF SEVENTH THORACIC VERTEBRA, UNSPECIFIED FRACTURE MORPHOLOGY, SEQUELA: ICD-10-CM

## 2022-10-05 PROCEDURE — 94729 DIFFUSING CAPACITY: CPT

## 2022-10-05 PROCEDURE — 94727 GAS DIL/WSHOT DETER LNG VOL: CPT

## 2022-10-05 PROCEDURE — 72146 MRI CHEST SPINE W/O DYE: CPT | Mod: TC

## 2022-10-05 PROCEDURE — 94010 BREATHING CAPACITY TEST: CPT

## 2022-11-18 PROBLEM — Z12.11 SPECIAL SCREENING FOR MALIGNANT NEOPLASMS, COLON: Status: ACTIVE | Noted: 2022-11-18

## 2022-11-28 DIAGNOSIS — R55 SYNCOPE: Primary | ICD-10-CM

## 2022-12-02 ENCOUNTER — HOSPITAL ENCOUNTER (OUTPATIENT)
Dept: CARDIOLOGY | Facility: HOSPITAL | Age: 52
Discharge: HOME | End: 2022-12-02
Attending: STUDENT IN AN ORGANIZED HEALTH CARE EDUCATION/TRAINING PROGRAM
Payer: MEDICAID

## 2022-12-02 VITALS — BODY MASS INDEX: 27.4 KG/M2 | WEIGHT: 185 LBS | HEIGHT: 69 IN

## 2022-12-02 DIAGNOSIS — R55 SYNCOPE: ICD-10-CM

## 2022-12-02 LAB
AORTIC ROOT ANNULUS: 3 CM
AORTIC VALVE CUSP SEPERATION: 1.7 CM
AV INDEX (PROSTH): 0.93
AV MEAN GRADIENT: 4 MMHG
AV PEAK GRADIENT: 9 MMHG
AV VALVE AREA: 2.92 CM2
AV VELOCITY RATIO: 0.75
BSA FOR ECHO PROCEDURE: 2.02 M2
CV ECHO LV RWT: 0.4 CM
DOP CALC AO PEAK VEL: 1.53 M/S
DOP CALC AO VTI: 33.9 CM
DOP CALC LVOT AREA: 3.1 CM2
DOP CALC LVOT DIAMETER: 2 CM
DOP CALC LVOT PEAK VEL: 1.14 M/S
DOP CALC LVOT STROKE VOLUME: 98.91 CM3
DOP CALCLVOT PEAK VEL VTI: 31.5 CM
E WAVE DECELERATION TIME: 237 MSEC
E/A RATIO: 1.05
E/E' RATIO: 6.83 M/S
ECHO LV POSTERIOR WALL: 0.9 CM (ref 0.6–1.1)
EJECTION FRACTION: 65 %
FRACTIONAL SHORTENING: 28 % (ref 28–44)
INTERVENTRICULAR SEPTUM: 1.07 CM (ref 0.6–1.1)
IVRT: 69 MSEC
LA MAJOR: 3.1 CM
LEFT ATRIUM SIZE: 3.1 CM
LEFT INTERNAL DIMENSION IN SYSTOLE: 3.28 CM (ref 2.1–4)
LEFT VENTRICLE DIASTOLIC VOLUME INDEX: 47.45 ML/M2
LEFT VENTRICLE DIASTOLIC VOLUME: 94.9 ML
LEFT VENTRICLE MASS INDEX: 76 G/M2
LEFT VENTRICLE SYSTOLIC VOLUME INDEX: 16.4 ML/M2
LEFT VENTRICLE SYSTOLIC VOLUME: 32.8 ML
LEFT VENTRICULAR INTERNAL DIMENSION IN DIASTOLE: 4.55 CM (ref 3.5–6)
LEFT VENTRICULAR MASS: 152.83 G
LV LATERAL E/E' RATIO: 6.83 M/S
LV SEPTAL E/E' RATIO: 6.83 M/S
LVOT MG: 3 MMHG
LVOT MV: 0.77 CM/S
MV PEAK A VEL: 0.78 M/S
MV PEAK E VEL: 0.82 M/S
MV STENOSIS PRESSURE HALF TIME: 62 MS
MV VALVE AREA P 1/2 METHOD: 3.55 CM2
RA PRESSURE: 3 MMHG
RIGHT VENTRICULAR END-DIASTOLIC DIMENSION: 2.18 CM
TDI LATERAL: 0.12 M/S
TDI SEPTAL: 0.12 M/S
TDI: 0.12 M/S

## 2022-12-02 PROCEDURE — 93306 TTE W/DOPPLER COMPLETE: CPT

## 2022-12-02 PROCEDURE — 93306 TTE W/DOPPLER COMPLETE: CPT | Mod: 26,,, | Performed by: INTERNAL MEDICINE

## 2022-12-02 PROCEDURE — 93306 ECHO (CUPID ONLY): ICD-10-PCS | Mod: 26,,, | Performed by: INTERNAL MEDICINE

## 2022-12-15 ENCOUNTER — TELEPHONE (OUTPATIENT)
Dept: GENETICS | Facility: CLINIC | Age: 52
End: 2022-12-15
Payer: MEDICAID

## 2022-12-15 NOTE — TELEPHONE ENCOUNTER
----- Message from Taryn Gorman CGC sent at 12/15/2022  1:25 PM CST -----  Is he affected himself? I think it can be GC only but will need a copy of report if possible.   ----- Message -----  From: Silvano Almendarez MA  Sent: 12/15/2022   1:05 PM CST  To: Taryn Gorman CGC    Daughter and son has had genetic testing but it was a long time ago. Daughter is older now, testing was done at [facility]. Daughter was diagnosed with Leukomalacia      ----- Message -----  From: Taryn Gorman CGC  Sent: 12/12/2022   3:37 PM CST  To: Silvano Almendarez MA    Can you find out if his daughters had genetic testing?   ----- Message -----  From: Silvano Almendarez MA  Sent: 12/12/2022   3:17 PM CST  To: Taryn Gorman CGC    First available fine?   ----- Message -----  From: Cheri Villegas  Sent: 12/12/2022   3:09 PM CST  To: [facility] Genetics Clinical Support Staff    Good afternoon,    The pt listed above is being referred by Elaine Levy for family hx of leukodystrophy. I have scanned the referral and records into . Please contact Maryanne Saadia at your earliest convenience to schedule his appt.          Thank You,  Cheri Malik

## 2022-12-15 NOTE — TELEPHONE ENCOUNTER
Called and spoke to significant other of pt due to pt driving. Asked it pt's daughter had any genetic testing. Informed that pt's daughter and son has had genetic testing done at [facility] year ago. Message sent to  to inform of how to schedule an appointment.

## 2022-12-15 NOTE — TELEPHONE ENCOUNTER
----- Message from Taryn Gorman [facility] sent at 12/12/2022  3:36 PM CST -----  Can you find out if his daughters had genetic testing?   ----- Message -----  From: Silvano Almendarez MA  Sent: 12/12/2022   3:17 PM CST  To: Taryn Gorman [facility]    First available fine?   ----- Message -----  From: Cheri Villegas  Sent: 12/12/2022   3:09 PM CST  To: [facility] Genetics Clinical Support Staff    Good afternoon,    The pt listed above is being referred by Elaine Levy for family hx of leukodystrophy. I have scanned the referral and records into . Please contact  Saadia at your earliest convenience to schedule his appt.          Thank You,  Cheri Malik

## 2022-12-15 NOTE — TELEPHONE ENCOUNTER
Called and spoke to pt's significant other, scheduled appt. Informed to have the daughter's reports sent over to the office, gave fax number. She confirmed understanding of time, and date of virtual

## 2023-01-18 ENCOUNTER — TELEPHONE (OUTPATIENT)
Dept: GENETICS | Facility: CLINIC | Age: 53
End: 2023-01-18
Payer: MEDICAID

## 2023-02-07 ENCOUNTER — LAB VISIT (OUTPATIENT)
Dept: LAB | Facility: HOSPITAL | Age: 53
End: 2023-02-07
Attending: NURSE PRACTITIONER
Payer: MEDICAID

## 2023-02-07 DIAGNOSIS — M25.50 PAIN IN JOINT, MULTIPLE SITES: Primary | ICD-10-CM

## 2023-02-07 LAB — CRP SERPL-MCNC: 6.7 MG/L (ref 0–8.2)

## 2023-02-07 PROCEDURE — 36415 COLL VENOUS BLD VENIPUNCTURE: CPT | Performed by: NURSE PRACTITIONER

## 2023-02-07 PROCEDURE — 86140 C-REACTIVE PROTEIN: CPT | Performed by: NURSE PRACTITIONER

## 2023-02-07 PROCEDURE — 86038 ANTINUCLEAR ANTIBODIES: CPT | Performed by: NURSE PRACTITIONER

## 2023-02-08 LAB — ANA SER QL IF: NORMAL

## 2023-09-01 ENCOUNTER — HOSPITAL ENCOUNTER (OUTPATIENT)
Dept: RADIOLOGY | Facility: HOSPITAL | Age: 53
Discharge: HOME | End: 2023-09-01
Attending: NURSE PRACTITIONER
Payer: MEDICAID

## 2023-09-01 DIAGNOSIS — G35 MS (MULTIPLE SCLEROSIS): ICD-10-CM

## 2023-09-01 PROCEDURE — 72156 MRI NECK SPINE W/O & W/DYE: CPT | Mod: TC

## 2023-09-01 PROCEDURE — 72156 MRI CERVICAL SPINE W WO CONTRAST: ICD-10-PCS | Mod: 26,,, | Performed by: RADIOLOGY

## 2023-09-01 PROCEDURE — A9585 GADOBUTROL INJECTION: HCPCS

## 2023-09-01 PROCEDURE — 72156 MRI NECK SPINE W/O & W/DYE: CPT | Mod: 26,,, | Performed by: RADIOLOGY

## 2023-09-01 PROCEDURE — 70553 MRI BRAIN STEM W/O & W/DYE: CPT | Mod: TC

## 2023-09-01 PROCEDURE — 72157 MRI CHEST SPINE W/O & W/DYE: CPT | Mod: 26,,, | Performed by: RADIOLOGY

## 2023-09-01 PROCEDURE — 70553 MRI BRAIN STEM W/O & W/DYE: CPT | Mod: 26,,, | Performed by: RADIOLOGY

## 2023-09-01 PROCEDURE — 25500020 PHARM REV CODE 255

## 2023-09-01 PROCEDURE — 72157 MRI THORACIC SPINE W WO CONTRAST: ICD-10-PCS | Mod: 26,,, | Performed by: RADIOLOGY

## 2023-09-01 PROCEDURE — 72157 MRI CHEST SPINE W/O & W/DYE: CPT | Mod: TC

## 2023-09-01 PROCEDURE — 70553 MRI BRAIN W WO CONTRAST: ICD-10-PCS | Mod: 26,,, | Performed by: RADIOLOGY

## 2023-09-01 RX ORDER — GADOBUTROL 604.72 MG/ML
INJECTION INTRAVENOUS
Status: COMPLETED
Start: 2023-09-01 | End: 2023-09-01

## 2023-09-01 RX ADMIN — GADOBUTROL 9 ML: 604.72 INJECTION INTRAVENOUS at 04:09

## 2023-12-21 ENCOUNTER — TELEPHONE (OUTPATIENT)
Dept: NEUROSURGERY | Facility: CLINIC | Age: 53
End: 2023-12-21
Payer: MEDICAID

## 2023-12-21 NOTE — TELEPHONE ENCOUNTER
----- Message from Amarilis Cook sent at 12/21/2023 11:06 AM CST -----  Contact: Prudence [phone]  Type: Needs Medical Advice  Who Called:  Prudence Pts significant other    Best Call Back Number: [phone]    Additional Information: Prudence calling to schedule pt appt for ms. She stated pt needs a lumbar puncture and she was advised by by pts insurance to try Dr Fall. Pls call back and advise

## 2023-12-21 NOTE — TELEPHONE ENCOUNTER
Returned call to pt's significant other and informed that Dr. Fall does not treat MS or accept pt's insurance for new patients. She voiced understanding.

## 2024-01-18 DIAGNOSIS — R32 INCONTINENCE OF URINE: Primary | ICD-10-CM

## 2024-01-18 DIAGNOSIS — M54.50 BILATERAL LOW BACK PAIN: ICD-10-CM

## 2024-01-18 DIAGNOSIS — R20.0 NUMBNESS: ICD-10-CM

## 2024-01-18 DIAGNOSIS — R29.898 BILATERAL LEG WEAKNESS: ICD-10-CM

## 2024-01-18 DIAGNOSIS — K59.09 CHRONIC CONSTIPATION: ICD-10-CM

## 2024-01-27 ENCOUNTER — HOSPITAL ENCOUNTER (OUTPATIENT)
Dept: RADIOLOGY | Facility: HOSPITAL | Age: 54
Discharge: HOME | End: 2024-01-27
Attending: NURSE PRACTITIONER
Payer: MEDICAID

## 2024-01-27 DIAGNOSIS — K59.09 CHRONIC CONSTIPATION: ICD-10-CM

## 2024-01-27 DIAGNOSIS — R32 INCONTINENCE OF URINE: ICD-10-CM

## 2024-01-27 DIAGNOSIS — R29.898 BILATERAL LEG WEAKNESS: ICD-10-CM

## 2024-01-27 DIAGNOSIS — M54.50 BILATERAL LOW BACK PAIN: ICD-10-CM

## 2024-01-27 DIAGNOSIS — R20.0 NUMBNESS: ICD-10-CM

## 2024-01-27 PROCEDURE — 72148 MRI LUMBAR SPINE W/O DYE: CPT | Mod: TC

## 2024-01-27 PROCEDURE — 72148 MRI LUMBAR SPINE W/O DYE: CPT | Mod: 26,,, | Performed by: RADIOLOGY

## 2024-01-29 ENCOUNTER — TELEPHONE (OUTPATIENT)
Dept: NEUROLOGY | Facility: CLINIC | Age: 54
End: 2024-01-29
Payer: MEDICAID

## 2024-01-30 ENCOUNTER — TELEPHONE (OUTPATIENT)
Dept: GENETICS | Facility: CLINIC | Age: 54
End: 2024-01-30
Payer: MEDICAID

## 2024-01-30 ENCOUNTER — TELEPHONE (OUTPATIENT)
Dept: NEUROLOGY | Facility: CLINIC | Age: 54
End: 2024-01-30
Payer: MEDICAID

## 2024-01-30 NOTE — TELEPHONE ENCOUNTER
----- Message from Kristin Moore sent at 1/30/2024 10:42 AM CST -----  Regarding: [facility]  I was unable to leave a VM on the number in chart. Active in [facility] a couple days ago so I booked and sent a message with my direct line. Set reminder to follow up.

## 2024-01-30 NOTE — TELEPHONE ENCOUNTER
Spoke with pt wife to schedule appt with GC. Pt wife scheduled virtual appt for 2/8 at 2:30. Pt wife confirmed and understood  appt.       ----- Message from Le Roper sent at 1/30/2024  3:03 PM CST -----  Type:  Sooner Apoointment Request    Caller is requesting a sooner appointment.  Caller declined first available appointment listed below.  Caller will not accept being placed on the waitlist and is requesting a message be sent to doctor.  Name of Caller:pt   When is the first available appointment?  Would the patient rather a call back or a response via [facility]? Call   Best Call Back Number:[phone]  Additional Information: states they would like to reschedule missed appt

## 2024-02-07 ENCOUNTER — TELEPHONE (OUTPATIENT)
Dept: GENETICS | Facility: CLINIC | Age: 54
End: 2024-02-07
Payer: MEDICAID

## 2024-02-08 ENCOUNTER — OFFICE VISIT (OUTPATIENT)
Dept: GENETICS | Facility: CLINIC | Age: 54
End: 2024-02-08
Payer: MEDICAID

## 2024-02-08 DIAGNOSIS — Z82.0: Primary | ICD-10-CM

## 2024-02-08 PROCEDURE — 96040 PR GENETIC COUNSELING, EACH 30 MIN: CPT | Mod: 95,,,
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
## 2024-02-28 ENCOUNTER — TELEPHONE (OUTPATIENT)
Dept: NEUROLOGY | Facility: CLINIC | Age: 54
End: 2024-02-28
Payer: MEDICAID

## 2024-02-29 ENCOUNTER — TELEPHONE (OUTPATIENT)
Dept: GENETICS | Facility: CLINIC | Age: 54
End: 2024-02-29
Payer: MEDICAID

## 2024-02-29 NOTE — TELEPHONE ENCOUNTER
Spoke with Mr. Zee to let him know that he and his children are recommended to follow-up with medical genetics for further work-up. They will be placed on a waitlist for follow-up. Full names and DOBs for children confirmed.

## 2024-03-04 ENCOUNTER — TELEPHONE (OUTPATIENT)
Dept: NEUROLOGY | Facility: CLINIC | Age: 54
End: 2024-03-04
Payer: MEDICAID

## 2024-03-07 ENCOUNTER — TELEPHONE (OUTPATIENT)
Dept: NEUROLOGY | Facility: CLINIC | Age: 54
End: 2024-03-07
Payer: MEDICAID

## 2024-03-15 ENCOUNTER — OFFICE VISIT (OUTPATIENT)
Dept: NEUROLOGY | Facility: CLINIC | Age: 54
End: 2024-03-15
Payer: MEDICAID

## 2024-03-15 ENCOUNTER — PATIENT MESSAGE (OUTPATIENT)
Dept: NEUROLOGY | Facility: CLINIC | Age: 54
End: 2024-03-15

## 2024-03-15 ENCOUNTER — LAB VISIT (OUTPATIENT)
Dept: LAB | Facility: HOSPITAL | Age: 54
End: 2024-03-15
Payer: MEDICAID

## 2024-03-15 VITALS
SYSTOLIC BLOOD PRESSURE: 102 MMHG | HEIGHT: 69 IN | HEART RATE: 60 BPM | BODY MASS INDEX: 28.26 KG/M2 | WEIGHT: 190.81 LBS | DIASTOLIC BLOOD PRESSURE: 70 MMHG

## 2024-03-15 DIAGNOSIS — G62.9 PERIPHERAL POLYNEUROPATHY: Primary | ICD-10-CM

## 2024-03-15 DIAGNOSIS — G62.9 PERIPHERAL NERVE DISORDER: Primary | ICD-10-CM

## 2024-03-15 DIAGNOSIS — H53.59 DYSCHROMATOPSIA: ICD-10-CM

## 2024-03-15 DIAGNOSIS — M48.00 SPINAL STENOSIS, UNSPECIFIED SPINAL REGION: ICD-10-CM

## 2024-03-15 DIAGNOSIS — M48.02 SPINAL STENOSIS OF CERVICAL REGION: ICD-10-CM

## 2024-03-15 DIAGNOSIS — I67.89 CEREBRAL MICROVASCULAR DISEASE: ICD-10-CM

## 2024-03-15 DIAGNOSIS — M25.519 SHOULDER PAIN, UNSPECIFIED CHRONICITY, UNSPECIFIED LATERALITY: ICD-10-CM

## 2024-03-15 LAB
CK SERPL-CCNC: 61 U/L (ref 20–200)
CRP SERPL-MCNC: 1.7 MG/L (ref 0–8.2)
ERYTHROCYTE [SEDIMENTATION RATE] IN BLOOD BY WESTERGREN METHOD: 2 MM/HR (ref 0–10)
FOLATE SERPL-MCNC: 6.2 NG/ML (ref 4–24)
VIT B12 SERPL-MCNC: 430 PG/ML (ref 210–950)

## 2024-03-15 PROCEDURE — 84252 ASSAY OF VITAMIN B-2: CPT

## 2024-03-15 PROCEDURE — 82746 ASSAY OF FOLIC ACID SERUM: CPT

## 2024-03-15 PROCEDURE — 82784 ASSAY IGA/IGD/IGG/IGM EACH: CPT | Mod: 91

## 2024-03-15 PROCEDURE — 99999 PR PBB SHADOW E&M-EST. PATIENT-LVL IV: CPT | Mod: PBBFAC,,, | Performed by: STUDENT IN AN ORGANIZED HEALTH CARE EDUCATION/TRAINING PROGRAM

## 2024-03-15 PROCEDURE — 82085 ASSAY OF ALDOLASE: CPT

## 2024-03-15 PROCEDURE — 86140 C-REACTIVE PROTEIN: CPT

## 2024-03-15 PROCEDURE — 84207 ASSAY OF VITAMIN B-6: CPT

## 2024-03-15 PROCEDURE — 84425 ASSAY OF VITAMIN B-1: CPT

## 2024-03-15 PROCEDURE — 99214 OFFICE O/P EST MOD 30 MIN: CPT | Mod: PBBFAC | Performed by: STUDENT IN AN ORGANIZED HEALTH CARE EDUCATION/TRAINING PROGRAM

## 2024-03-15 PROCEDURE — 3078F DIAST BP <80 MM HG: CPT | Mod: CPTII,,, | Performed by: STUDENT IN AN ORGANIZED HEALTH CARE EDUCATION/TRAINING PROGRAM

## 2024-03-15 PROCEDURE — 84630 ASSAY OF ZINC: CPT

## 2024-03-15 PROCEDURE — 3008F BODY MASS INDEX DOCD: CPT | Mod: CPTII,,, | Performed by: STUDENT IN AN ORGANIZED HEALTH CARE EDUCATION/TRAINING PROGRAM

## 2024-03-15 PROCEDURE — 82726 LONG CHAIN FATTY ACIDS: CPT

## 2024-03-15 PROCEDURE — 82525 ASSAY OF COPPER: CPT

## 2024-03-15 PROCEDURE — 36415 COLL VENOUS BLD VENIPUNCTURE: CPT

## 2024-03-15 PROCEDURE — 3074F SYST BP LT 130 MM HG: CPT | Mod: CPTII,,, | Performed by: STUDENT IN AN ORGANIZED HEALTH CARE EDUCATION/TRAINING PROGRAM

## 2024-03-15 PROCEDURE — 86039 ANTINUCLEAR ANTIBODIES (ANA): CPT

## 2024-03-15 PROCEDURE — 82784 ASSAY IGA/IGD/IGG/IGM EACH: CPT | Mod: 59

## 2024-03-15 PROCEDURE — 82607 VITAMIN B-12: CPT

## 2024-03-15 PROCEDURE — 82550 ASSAY OF CK (CPK): CPT

## 2024-03-15 PROCEDURE — 85651 RBC SED RATE NONAUTOMATED: CPT

## 2024-03-15 PROCEDURE — 99205 OFFICE O/P NEW HI 60 MIN: CPT | Mod: S$PBB,,, | Performed by: STUDENT IN AN ORGANIZED HEALTH CARE EDUCATION/TRAINING PROGRAM

## 2024-03-15 PROCEDURE — 84446 ASSAY OF VITAMIN E: CPT

## 2024-03-15 PROCEDURE — 82595 ASSAY OF CRYOGLOBULIN: CPT

## 2024-03-15 PROCEDURE — 84591 ASSAY OF NOS VITAMIN: CPT

## 2024-03-15 PROCEDURE — 0361U NEURFLMNT LT CHN DIG IA QUAN: CPT

## 2024-03-15 PROCEDURE — 1159F MED LIST DOCD IN RCRD: CPT | Mod: CPTII,,, | Performed by: STUDENT IN AN ORGANIZED HEALTH CARE EDUCATION/TRAINING PROGRAM

## 2024-03-15 RX ORDER — PRAVASTATIN SODIUM 40 MG/1
40 TABLET ORAL
COMMUNITY
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
## 2024-03-17 LAB — ALDOLASE SERPL-CCNC: 4.6 U/L

## 2024-03-19 PROBLEM — M48.02 SPINAL STENOSIS OF CERVICAL REGION: Status: ACTIVE | Noted: 2024-03-19

## 2024-03-19 PROBLEM — I67.89 CEREBRAL MICROVASCULAR DISEASE: Status: ACTIVE | Noted: 2024-03-19

## 2024-03-19 LAB
ANA PAT SER IF-IMP: NORMAL
ANA PAT SER IF-IMP: NORMAL
ANA SER QL HEP2 SUBST: NORMAL
ANA TITR SER HEP2 SUBST: NORMAL {TITER}
ANA TITR SER HEP2 SUBST: NORMAL {TITER}
CYTOPLASMIC AB PATTERN SER IF-IMP: NORMAL
GLYCOSYLATION: NORMAL
IGA SERPL-MCNC: 165 MG/DL (ref 61–356)
IGA SERPL-MCNC: 185 MG/DL (ref 90–386)
IGG SERPL-MCNC: 791 MG/DL (ref 767–1590)
IGG SERPL-MCNC: 809 MG/DL (ref 603–1613)
IGM SERPL-MCNC: 54 MG/DL (ref 37–286)
IGM SERPL-MCNC: 63 MG/DL (ref 20–172)
IMMUNOLOGIST REVIEW: NORMAL
LABORATORY COMMENT REPORT: NORMAL
M PROTEIN SERPL QL: NEGATIVE
M-PROTEIN AK: NORMAL
M-PROTEIN AL: NORMAL
M-PROTEIN GK: NORMAL
M-PROTEIN GL: NORMAL
M-PROTEIN MK: NORMAL
M-PROTEIN ML: NORMAL
NIACIN SERPL-MCNC: <5 NG/ML
NICOTINAMIDE SERPL-MCNC: 42.4 NG/ML (ref 5–48)
NICOTINURATE SERPL-MCNC: <5 NG/ML
PROT PATTERN SERPL IFE-IMP: NORMAL
VIT B2 SERPL-MCNC: 54 MCG/L (ref 1–19)

## 2024-03-20 LAB — ZINC SERPL-MCNC: 99 UG/DL (ref 60–130)

## 2024-03-21 LAB
A-TOCOPHEROL VIT E SERPL-MCNC: 994 UG/DL (ref 500–1800)
ANNOTATION COMMENT IMP: NORMAL
COPPER SERPL-MCNC: 931 UG/L (ref 665–1480)
PHYTANATE SERPL-SCNC: 2.51 NMOL/ML
PRISTANATE SERPL-SCNC: 0.17 NMOL/ML
PRISTANATE/PHYTANATE SERPL-SRTO: 0.07 RATIO
PYRIDOXAL SERPL-MCNC: 66 UG/L (ref 5–50)
VIT B1 BLD-MCNC: 50 UG/L (ref 38–122)
VLCFA C22:0 SERPL-SCNC: 57.6 NMOL/ML
VLCFA C24:0 SERPL-SCNC: 51.7 NMOL/ML
VLCFA C24:0/C22:0 SERPL-SRTO: 0.9 RATIO
VLCFA C26:0 SERPL-SCNC: 0.5 NMOL/ML
VLCFA C26:0/C22:0 SERPL-SRTO: 0.01 RATIO

## 2024-03-27 LAB — CRYOGLOB SER QL: NORMAL

## 2024-03-29 ENCOUNTER — PATIENT MESSAGE (OUTPATIENT)
Dept: GENETICS | Facility: CLINIC | Age: 54
End: 2024-03-29
Payer: MEDICAID

## 2024-03-30 LAB — NEUROFILAMENT LIGHT CHAIN, PLASMA: 5.7 PG/ML

## 2024-04-01 ENCOUNTER — TELEPHONE (OUTPATIENT)
Dept: NEUROLOGY | Facility: CLINIC | Age: 54
End: 2024-04-01
Payer: MEDICAID

## 2024-04-01 NOTE — TELEPHONE ENCOUNTER
Spoke with pt spouse to schedule him an appointment with Dr. Myles leone. Pt will be scheduled on 4/8/2024 at 3:20 for a virtual.

## 2024-05-03 ENCOUNTER — OFFICE VISIT (OUTPATIENT)
Dept: NEUROLOGY | Facility: CLINIC | Age: 54
End: 2024-05-03
Payer: MEDICAID

## 2024-05-03 ENCOUNTER — PATIENT MESSAGE (OUTPATIENT)
Dept: NEUROLOGY | Facility: CLINIC | Age: 54
End: 2024-05-03

## 2024-05-03 DIAGNOSIS — G62.9 PERIPHERAL POLYNEUROPATHY: ICD-10-CM

## 2024-05-03 DIAGNOSIS — R41.89 COGNITIVE DECLINE: ICD-10-CM

## 2024-05-03 DIAGNOSIS — M48.02 SPINAL STENOSIS OF CERVICAL REGION: Primary | ICD-10-CM

## 2024-05-03 PROCEDURE — 99214 OFFICE O/P EST MOD 30 MIN: CPT | Mod: 95,,, | Performed by: STUDENT IN AN ORGANIZED HEALTH CARE EDUCATION/TRAINING PROGRAM
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
## 2024-05-03 NOTE — PATIENT INSTRUCTIONS
Neurosurgery spine referral  Neuro-opthalmololgy referral  Neuropsycology testing for memory  Neuromuscular disease clinic referral.

## 2024-05-08 ENCOUNTER — TELEPHONE (OUTPATIENT)
Dept: OPHTHALMOLOGY | Facility: CLINIC | Age: 54
End: 2024-05-08
Payer: MEDICAID

## 2024-05-08 NOTE — TELEPHONE ENCOUNTER
----- Message from Kamryn Cox MA sent at 5/7/2024  3:44 PM CDT -----  For Dr Swain  ----- Message -----  From: Shavonne Bundy MA  Sent: 5/7/2024   3:37 PM CDT  To: [facility] Clinical Support Staff    Good Afternoon , I havr this patient who needs to be scheduled within ophthalmology ; referral has been put in by Dr. Tristan .

## 2024-05-08 NOTE — TELEPHONE ENCOUNTER
Called pt numerous times about referral. He does not have a voicemail, unable to leave a message.

## 2024-07-01 DIAGNOSIS — M25.511 RIGHT SHOULDER PAIN, UNSPECIFIED CHRONICITY: Primary | ICD-10-CM

## 2024-08-16 ENCOUNTER — PATIENT MESSAGE (OUTPATIENT)
Dept: NEUROLOGY | Facility: CLINIC | Age: 54
End: 2024-08-16
Payer: MEDICAID

## 2024-08-29 ENCOUNTER — PATIENT MESSAGE (OUTPATIENT)
Dept: NEUROLOGY | Facility: CLINIC | Age: 54
End: 2024-08-29
Payer: MEDICAID

## 2024-08-30 ENCOUNTER — PATIENT MESSAGE (OUTPATIENT)
Dept: GENETICS | Facility: CLINIC | Age: 54
End: 2024-08-30
Payer: MEDICAID

## 2024-09-29 ENCOUNTER — OFFICE VISIT (OUTPATIENT)
Dept: URGENT CARE | Facility: CLINIC | Age: 54
End: 2024-09-29
Payer: MEDICAID

## 2024-09-29 VITALS
DIASTOLIC BLOOD PRESSURE: 73 MMHG | OXYGEN SATURATION: 95 % | TEMPERATURE: 98 F | SYSTOLIC BLOOD PRESSURE: 106 MMHG | HEART RATE: 85 BPM | RESPIRATION RATE: 18 BRPM | WEIGHT: 190 LBS | HEIGHT: 69 IN | BODY MASS INDEX: 28.14 KG/M2

## 2024-09-29 DIAGNOSIS — M70.22 OLECRANON BURSITIS OF LEFT ELBOW: Primary | ICD-10-CM

## 2024-09-29 PROCEDURE — 99214 OFFICE O/P EST MOD 30 MIN: CPT | Mod: S$GLB,,, | Performed by: NURSE PRACTITIONER

## 2024-09-29 RX ORDER — CEPHALEXIN 500 MG/1
500 CAPSULE ORAL EVERY 6 HOURS
Qty: 28 CAPSULE | Refills: 0 | Status: SHIPPED | OUTPATIENT
Start: 2024-09-29 | End: 2024-10-06

## 2024-09-29 NOTE — PROGRESS NOTES
"Subjective:      Patient ID: Moreno Zee is a 53 y.o. male.    Vitals:  height is 5' 9" (1.753 m) and weight is 86.2 kg (190 lb). His oral temperature is 97.8 °F (36.6 °C). His blood pressure is 106/73 and his pulse is 85. His respiration is 18 and oxygen saturation is 95%.     Chief Complaint: Elbow Pain    Pt presents with left elbow pain. He states that he hit his elbow on car door  about 3 days ago. The elbow is current swollen and red.     Elbow Pain  This is a new problem. Episode onset: x 3 days. The problem has been waxing and waning. Associated symptoms include arthralgias and joint swelling (Left elbow). Pertinent negatives include no numbness. He has tried acetaminophen for the symptoms.       Musculoskeletal:  Positive for trauma, joint pain and joint swelling (Left elbow). Negative for abnormal ROM of joint.   Skin:  Positive for erythema (mild). Negative for wound.   Neurological:  Negative for numbness and tingling.      Objective:     Physical Exam   Constitutional: He is oriented to person, place, and time.  Non-toxic appearance. He does not appear ill. No distress.   HENT:   Head: Normocephalic and atraumatic.   Nose: Nose normal.   Mouth/Throat: Mucous membranes are moist.   Eyes: Conjunctivae are normal.   Cardiovascular: Normal rate.   Pulmonary/Chest: Effort normal. No respiratory distress.   Abdominal: Normal appearance.   Musculoskeletal: Normal range of motion.         General: Swelling and tenderness present. No deformity or signs of injury. Normal range of motion.      Comments: See attached photos     Neurological: no focal deficit. He is alert and oriented to person, place, and time.   Skin: Skin is warm, dry and no rash. Capillary refill takes 2 to 3 seconds. erythema (mild) No bruising and No lesion         Comments: No surrounding induration or evidence of cellulitis.  Mildly warm to touch     Psychiatric: His behavior is normal. Mood normal.   Nursing note and vitals " reviewed.                  Assessment:     1. Olecranon bursitis of left elbow      Ace wrap left elbow placed in clinic  Plan:       Olecranon bursitis of left elbow  -     HME - OTHER  -     Ambulatory referral/consult to Orthopedics  -     cephALEXin (KEFLEX) 500 MG capsule; Take 1 capsule (500 mg total) by mouth every 6 (six) hours. for 7 days  Dispense: 28 capsule; Refill: 0                Highly suspect inflammatory bursitis versus septic bursitis from my exam however without certainty, lacking aspirate capabilities in an urgent care setting will initiate antibiotics and defer to specialist

## 2024-09-29 NOTE — PATIENT INSTRUCTIONS
Keflex 4 times a day for 7 days    Warm to hot compresses for 15 20 minutes every he 3-4 hours until symptoms have significantly improve or at least 4 times a day.    Please seek medical re-evaluation or go to the ER if symptoms worsen or fail to improve after 48-72 hours of antibiotics.    Referral was placed to Orthopedics.  Someone should be calling you to schedule an appointment or to let you know that you are not on the plan in network with them.

## 2024-10-07 ENCOUNTER — TELEPHONE (OUTPATIENT)
Dept: NEUROLOGY | Facility: CLINIC | Age: 54
End: 2024-10-07
Payer: MEDICAID

## 2024-10-07 NOTE — TELEPHONE ENCOUNTER
----- Message from Med Assistant Abel sent at 10/7/2024  8:28 AM CDT -----  Regarding: FW: no availability  Contact: Pt's Wife Prudence @[phone]  New patient  ----- Message -----  From: Yadi Garrett  Sent: 2024   1:22 PM CDT  To: Yamel Bhat Staff  Subject: no availability                                  Pt called in to schedule an appt; no available appts in Epic. Pt is asking for a call back soon to schedule. Thanks.            Patient's DX: G62.9 (ICD-10-CM) - Peripheral polyneuropathy       Patient requesting call back or [facility] [phone]

## 2024-10-10 ENCOUNTER — TELEPHONE (OUTPATIENT)
Dept: NEUROLOGY | Facility: CLINIC | Age: 54
End: 2024-10-10
Payer: MEDICAID

## 2024-10-23 ENCOUNTER — DOCUMENTATION ONLY (OUTPATIENT)
Dept: NEUROLOGY | Facility: CLINIC | Age: 54
End: 2024-10-23
Payer: MEDICAID

## 2024-10-23 NOTE — PROGRESS NOTES
Successfully faxed pt referral on 10/22 to Dr Elizabeth Cornejo/[facility] to [phone].

## 2024-11-06 DIAGNOSIS — M25.511 RIGHT SHOULDER PAIN, UNSPECIFIED CHRONICITY: Primary | ICD-10-CM

## 2024-11-15 DIAGNOSIS — M25.512 LEFT SHOULDER PAIN, UNSPECIFIED CHRONICITY: Primary | ICD-10-CM

## 2024-11-18 ENCOUNTER — HOSPITAL ENCOUNTER (OUTPATIENT)
Dept: RADIOLOGY | Facility: HOSPITAL | Age: 54
Discharge: HOME | End: 2024-11-18
Attending: ORTHOPAEDIC SURGERY
Payer: MEDICAID

## 2024-11-18 ENCOUNTER — OFFICE VISIT (OUTPATIENT)
Dept: ORTHOPEDICS | Facility: CLINIC | Age: 54
End: 2024-11-18
Payer: MEDICAID

## 2024-11-18 VITALS — BODY MASS INDEX: 28.15 KG/M2 | HEIGHT: 69 IN | WEIGHT: 190.06 LBS

## 2024-11-18 DIAGNOSIS — M25.512 LEFT SHOULDER PAIN, UNSPECIFIED CHRONICITY: ICD-10-CM

## 2024-11-18 DIAGNOSIS — M25.519 SHOULDER PAIN, UNSPECIFIED CHRONICITY, UNSPECIFIED LATERALITY: ICD-10-CM

## 2024-11-18 PROCEDURE — 99999PBSHW PR PBB SHADOW TECHNICAL ONLY FILED TO HB: Mod: PBBFAC,,,

## 2024-11-18 PROCEDURE — 99213 OFFICE O/P EST LOW 20 MIN: CPT | Mod: PBBFAC,25,PO | Performed by: ORTHOPAEDIC SURGERY

## 2024-11-18 PROCEDURE — 73030 X-RAY EXAM OF SHOULDER: CPT | Mod: 26,LT,, | Performed by: RADIOLOGY

## 2024-11-18 PROCEDURE — 20610 DRAIN/INJ JOINT/BURSA W/O US: CPT | Mod: PBBFAC,PO | Performed by: ORTHOPAEDIC SURGERY

## 2024-11-18 PROCEDURE — 73030 X-RAY EXAM OF SHOULDER: CPT | Mod: TC,PO,LT

## 2024-11-18 PROCEDURE — 99999 PR PBB SHADOW E&M-EST. PATIENT-LVL III: CPT | Mod: PBBFAC,,, | Performed by: ORTHOPAEDIC SURGERY

## 2024-11-18 RX ORDER — TRIAMCINOLONE ACETONIDE 40 MG/ML
80 INJECTION, SUSPENSION INTRA-ARTICULAR; INTRAMUSCULAR
Status: DISCONTINUED | OUTPATIENT
Start: 2024-11-18 | End: 2024-11-18 | Stop reason: HOSPADM

## 2024-11-18 RX ADMIN — TRIAMCINOLONE ACETONIDE 80 MG: 40 INJECTION, SUSPENSION INTRA-ARTICULAR; INTRAMUSCULAR at 11:11

## 2024-11-18 NOTE — PROCEDURES
Large Joint Aspiration/Injection: L subacromial bursa    Date/Time: 11/18/2024 11:00 AM    Performed by: Raulito Ruiz MD  Authorized by: Raulito Ruiz MD    Consent Done?:  Yes (Verbal)  Site marked: the procedure site was marked    Prep: patient was prepped and draped in usual sterile fashion      Details:  Needle Size:  21 G  Approach:  Posterior  Location:  Shoulder  Site:  L subacromial bursa  Medications:  80 mg triamcinolone acetonide 40 mg/mL  Patient tolerance:  Patient tolerated the procedure well with no immediate complications

## 2024-11-18 NOTE — PROGRESS NOTES
54 years old left shoulder pain been present now for years difficulty with overhead activity pain is up to 10 on the pain scale.  Patient reports have had physical therapy and oral medications without relief he is interested in the possibility of injection.      Past history significant for cervical spine fusion June of 2022    Exam shows cuff strength weak and painful, positive Neer Garzon impingement sign    X-rays are negative     Assessment:  Left shoulder impingement probable rotator cuff tear     Plan: Kenalog injection left shoulder subacromial space, home exercise program, follow-up as needed

## 2024-12-04 ENCOUNTER — TELEPHONE (OUTPATIENT)
Dept: NEUROLOGY | Facility: CLINIC | Age: 54
End: 2024-12-04
Payer: MEDICAID

## 2024-12-04 NOTE — TELEPHONE ENCOUNTER
Spoke to patient to schedule appointment (NP) on 12/5 earlier date. Patient verbalized agreement

## 2024-12-05 ENCOUNTER — OFFICE VISIT (OUTPATIENT)
Dept: NEUROLOGY | Facility: CLINIC | Age: 54
End: 2024-12-05
Payer: MEDICAID

## 2024-12-05 VITALS
HEIGHT: 69 IN | HEART RATE: 80 BPM | SYSTOLIC BLOOD PRESSURE: 106 MMHG | DIASTOLIC BLOOD PRESSURE: 73 MMHG | WEIGHT: 195.88 LBS | BODY MASS INDEX: 29.01 KG/M2

## 2024-12-05 DIAGNOSIS — M48.02 SPINAL STENOSIS OF CERVICAL REGION: ICD-10-CM

## 2024-12-05 DIAGNOSIS — G62.9 PERIPHERAL POLYNEUROPATHY: ICD-10-CM

## 2024-12-05 DIAGNOSIS — N39.41 URGE INCONTINENCE: Primary | ICD-10-CM

## 2024-12-05 DIAGNOSIS — E67.8 EXCESSIVE VITAMIN B6 INTAKE: ICD-10-CM

## 2024-12-05 DIAGNOSIS — S14.0XXS: ICD-10-CM

## 2024-12-05 PROBLEM — S14.0XXA CONCUSSION AND EDEMA OF CERVICAL SPINAL CORD: Status: ACTIVE | Noted: 2024-12-05

## 2024-12-05 PROCEDURE — 1159F MED LIST DOCD IN RCRD: CPT | Mod: CPTII,,, | Performed by: PSYCHIATRY & NEUROLOGY

## 2024-12-05 PROCEDURE — 99213 OFFICE O/P EST LOW 20 MIN: CPT | Mod: PBBFAC | Performed by: PSYCHIATRY & NEUROLOGY

## 2024-12-05 PROCEDURE — G2211 COMPLEX E/M VISIT ADD ON: HCPCS | Mod: S$PBB,,, | Performed by: PSYCHIATRY & NEUROLOGY

## 2024-12-05 PROCEDURE — 3078F DIAST BP <80 MM HG: CPT | Mod: CPTII,,, | Performed by: PSYCHIATRY & NEUROLOGY

## 2024-12-05 PROCEDURE — 3008F BODY MASS INDEX DOCD: CPT | Mod: CPTII,,, | Performed by: PSYCHIATRY & NEUROLOGY

## 2024-12-05 PROCEDURE — 99215 OFFICE O/P EST HI 40 MIN: CPT | Mod: S$PBB,,, | Performed by: PSYCHIATRY & NEUROLOGY

## 2024-12-05 PROCEDURE — 99999 PR PBB SHADOW E&M-EST. PATIENT-LVL III: CPT | Mod: PBBFAC,,, | Performed by: PSYCHIATRY & NEUROLOGY

## 2024-12-05 PROCEDURE — 3074F SYST BP LT 130 MM HG: CPT | Mod: CPTII,,, | Performed by: PSYCHIATRY & NEUROLOGY

## 2024-12-05 RX ORDER — OXYBUTYNIN CHLORIDE 5 MG/1
5 TABLET ORAL 2 TIMES DAILY
Qty: 60 TABLET | Refills: 11 | Status: SHIPPED | OUTPATIENT
Start: 2024-12-05 | End: 2025-12-05

## 2024-12-05 RX ORDER — OXYBUTYNIN CHLORIDE 5 MG/1
5 TABLET ORAL 2 TIMES DAILY
Qty: 60 TABLET | Refills: 11 | Status: SHIPPED | OUTPATIENT
Start: 2024-12-05 | End: 2024-12-05

## 2024-12-05 NOTE — PATIENT INSTRUCTIONS
Walk and exercise  Fall precautions    In case of any question, plz contact through [facility].'
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
## 2024-12-14 ENCOUNTER — HOSPITAL ENCOUNTER (OUTPATIENT)
Dept: RADIOLOGY | Facility: HOSPITAL | Age: 54
Discharge: HOME | End: 2024-12-14
Attending: PSYCHIATRY & NEUROLOGY
Payer: MEDICAID

## 2024-12-14 DIAGNOSIS — M48.02 SPINAL STENOSIS OF CERVICAL REGION: ICD-10-CM

## 2024-12-14 PROCEDURE — 72141 MRI NECK SPINE W/O DYE: CPT | Mod: 26,,, | Performed by: RADIOLOGY

## 2024-12-14 PROCEDURE — 72141 MRI NECK SPINE W/O DYE: CPT | Mod: TC

## 2024-12-27 ENCOUNTER — PATIENT MESSAGE (OUTPATIENT)
Dept: NEUROLOGY | Facility: CLINIC | Age: 54
End: 2024-12-27
Payer: MEDICAID

## 2025-01-13 ENCOUNTER — PATIENT MESSAGE (OUTPATIENT)
Facility: CLINIC | Age: 55
End: 2025-01-13
Payer: MEDICAID

## 2025-01-13 ENCOUNTER — TELEPHONE (OUTPATIENT)
Facility: CLINIC | Age: 55
End: 2025-01-13
Payer: MEDICAID

## 2025-01-13 NOTE — TELEPHONE ENCOUNTER
Calling pt to inform the EMG needs to be cancelled because EMG tech not available. There was no answer and no voicemail

## 2025-01-15 ENCOUNTER — PATIENT MESSAGE (OUTPATIENT)
Facility: CLINIC | Age: 55
End: 2025-01-15

## 2025-01-15 ENCOUNTER — PATIENT MESSAGE (OUTPATIENT)
Dept: GENETICS | Facility: CLINIC | Age: 55
End: 2025-01-15
Payer: MEDICAID

## 2025-01-15 ENCOUNTER — PROCEDURE VISIT (OUTPATIENT)
Facility: CLINIC | Age: 55
End: 2025-01-15
Payer: MEDICAID

## 2025-01-15 DIAGNOSIS — G95.89 MYELOMALACIA OF CERVICAL CORD: Primary | ICD-10-CM

## 2025-01-15 DIAGNOSIS — G62.9 SMALL FIBER NEUROPATHY: ICD-10-CM

## 2025-01-15 DIAGNOSIS — M54.17 RIGHT LUMBOSACRAL RADICULOPATHY: ICD-10-CM

## 2025-01-15 DIAGNOSIS — G62.9 PERIPHERAL POLYNEUROPATHY: ICD-10-CM

## 2025-01-15 DIAGNOSIS — E67.8 EXCESSIVE VITAMIN B6 INTAKE: ICD-10-CM

## 2025-01-15 PROCEDURE — 95885 MUSC TST DONE W/NERV TST LIM: CPT | Mod: 26,S$PBB,, | Performed by: PSYCHIATRY & NEUROLOGY

## 2025-01-15 PROCEDURE — 95913 NRV CNDJ TEST 13/> STUDIES: CPT | Mod: 26,S$PBB,, | Performed by: PSYCHIATRY & NEUROLOGY

## 2025-01-15 PROCEDURE — 95913 NRV CNDJ TEST 13/> STUDIES: CPT | Mod: PBBFAC | Performed by: PSYCHIATRY & NEUROLOGY

## 2025-01-15 PROCEDURE — 95885 MUSC TST DONE W/NERV TST LIM: CPT | Mod: PBBFAC | Performed by: PSYCHIATRY & NEUROLOGY

## 2025-01-15 RX ORDER — MIRABEGRON 25 MG/1
25 TABLET, FILM COATED, EXTENDED RELEASE ORAL DAILY
COMMUNITY
End: 2025-01-15 | Stop reason: SDUPTHER

## 2025-01-15 RX ORDER — MIRABEGRON 25 MG/1
25 TABLET, FILM COATED, EXTENDED RELEASE ORAL DAILY
Qty: 30 TABLET | Refills: 3 | Status: SHIPPED | OUTPATIENT
Start: 2025-01-15

## 2025-01-15 NOTE — PATIENT INSTRUCTIONS
Walk and exercise  Physiotherapy of back    In case of any question, plz contact through [facility].

## 2025-01-15 NOTE — PROCEDURES
Department of Neurology  Phone No: [phone], Fax: [phone]    Neurography & Electromyography Report        Full Name: Moreno Zee Gender: Male  Patient ID: [id number] YOB: 1970      Visit Date: 1/15/2025 10:50 AM  Age: 54 Years  Examining Physician: Home Montesinos MD  Height: 5 feet 9 inch  Weight: 195 lbs    Moreno Zee [id number] 1/15/2025 10:50 AM     Reason for Referral:    Concern for polyneuropathy in the setting of family history of leukodystrophy.      Relevant medical diagnoses:    Cervical spinal stenosis with myelomalacia.    High vitamin B6 level    Surgical procedures:    s/p ACDF (2022)    Moreno eZe [id number] 1/15/2025 10:50 AM       History and Examination:    54-year-old male with tingling and numbness.  On examination, mildly reduced vibration sense in toes with patchy pinprick sensory loss.  Bilaterally upgoing planters with normal strength.    Technical Difficulties:    None.      Interpretation:     Abnormal study.  There is electrodiagnostic evidence of mild right median mononeuropathy (carpal tunnel syndrome) at wrist. Additionally, there is evidence of mild right lumbosacral radiculopathy (mainly S1).  There is no evidence of polyneuropathy or right lower cervical radiculopathy.    Please correlate clinically.      Home Montesinos MD, FRCPE, FCPS, CHCQM  Neuromuscular Consultant  [facility]    Moreno Zee [id number] 1/15/2025 10:50 AM                  Sensory NCS      Nerve / Sites Rec. Site Segments Onset Lat Peak Lat Onset Angel Temp. Amp Distance      ms ms m/s °C µV mm   L Median - Dig II (Antidromic)      Wrist Index Wrist - Index 2.55 3.33 54.9 33.8 19.7 140      Ref.  Ref. <=3.30 <=4.00   >=7.0    R Median - Dig II (Antidromic)      Wrist Index Wrist - Index 3.33 4.17 42.0 32.1 9.8 140      Ref.  Ref. <=3.30 <=4.00   >=7.0    L Ulnar - Dig V (Antidromic)      Wrist Dig V Wrist - Dig V 2.14 2.81 65.6 34 13.9 140      Ref.  Ref.  <=3.10 <=4.00   >=5.0    R Ulnar - Dig V (Antidromic)      Wrist Dig V Wrist - Dig V 2.14 2.71 65.6 32.2 22.3 140      Ref.  Ref. <=3.10 <=4.00   >=5.0    L Radial - Superficial (Antidromic)      Forearm Wrist Forearm - Wrist 1.25 2.03 80.0 34.1 22.6 100      Ref.  Ref. <=2.20 <=2.80   >=7.0    R Radial - Superficial (Antidromic)      Forearm Wrist Forearm - Wrist 1.35 1.93 73.8 32.4 28.1 100      Ref.  Ref. <=2.20 <=2.80   >=7.0    L Sural - (Antidromic)      Calf Ankle Calf - Ankle 2.71 3.39 51.7 31.7 10.7 140      Ref.  Ref. <=3.60 <=4.50   >=4.0    R Sural - (Antidromic)      Calf Ankle Calf - Ankle 2.92 3.80 48.0 31.3 12.5 140      Ref.  Ref. <=3.60 <=4.50   >=4.0    L Median, Ulnar - Transcarpal comparison      Median Palm Wrist Median Palm - Wrist 1.56 2.24 51.2 34.1 50.2 80      Ulnar Palm Wrist Ulnar Palm - Wrist 1.15 1.88 69.8 34.1 17.7 80     Median Palm - Ulnar Palm         R Median, Ulnar - Transcarpal comparison      Median Palm Wrist Median Palm - Wrist 2.45 3.02 32.7 32.5 17.3 80      Ulnar Palm Wrist Ulnar Palm - Wrist 1.25 1.61 64.0 32.5 17.1 80     Median Palm - Ulnar Palm             Motor NCS      Nerve / Sites Muscle Segments Latency Ref. Velocity Ref. Amplitude Ref. Temp. Dur. Distance      ms ms m/s m/s mV mV °C ms mm   L Median - APB      Wrist APB Wrist - APB 3.13 <=4.70   9.6 >=4.2 34 4.7 80      Elbow APB Elbow - Wrist 7.29  62 >=47 8.2  34 5.1 260   R Median - APB      Wrist APB Wrist - APB 4.23 <=4.70   6.2 >=4.2 32.5 4.7 80      Elbow APB Elbow - Wrist 8.73  56 >=47 6.0  32.4 5.0 250   L Ulnar - ADM      Wrist ADM Wrist - ADM 2.40 <=3.70   14.8 >=7.9 34 5.5 80      B.Elbow ADM B.Elbow - Wrist 6.23  60 >=52 13.7  34.3 5.4 230      A.Elbow ADM A.Elbow - B.Elbow 7.81  63 >=43 13.6  34.1 6.0 100     A.Elbow - Wrist       34.1     R Ulnar - ADM      Wrist ADM Wrist - ADM 2.33 <=3.70   12.1 >=7.9 32.1 4.5 80      B.Elbow ADM B.Elbow - Wrist 5.98  63 >=52 10.0  32.5 4.8 230      A.Elbow ADM  A.Elbow - B.Elbow 7.38  72 >=43 10.1  32.5 4.9 100     A.Elbow - Wrist       32.5     L Peroneal - EDB      Ankle EDB Ankle - EDB 5.88 <=6.50   1.5 >=1.1 30.8 7.2 80      B. Fib Head EDB B. Fib Head - Ankle 14.85  36 >=36 1.4  31.1 8.3 325      A. Fib Head EDB A. Fib Head - B. Fib Head 17.10  44 >=42 1.4  31 8.3 100   R Peroneal - EDB      Ankle EDB Ankle - EDB 3.58 <=6.50   1.3 >=1.1 31.6 7.9 80      B. Fib Head EDB B. Fib Head - Ankle 10.98  45 >=36 1.4  31.7 8.3 330      A. Fib Head EDB A. Fib Head - B. Fib Head 13.23  44 >=42 1.1  31.7 11.1 100   L Tibial - AH      Ankle AH Ankle - AH 3.77 <=6.10   7.8 >=5.3 30.7 7.0 80   R Tibial - AH      Ankle AH Ankle - AH 3.90 <=6.10   9.4 >=5.3 31.7 5.6 80       F  Wave      Nerve F Latency Ref. M Latency F - M Lat    ms ms ms ms   L Median - APB 25.9 <=31.5 3.5 22.3   L Ulnar - ADM 26.7 <=30.9 2.8 23.9   L Peroneal - EDB 56.6 <=63.6 6.2 50.4   L Tibial - AH 49.1 <=61.1 4.3 44.8   R Peroneal - EDB 54.8 <=63.6 4.3 50.5   R Tibial - AH 49.7 <=61.1 4.8 44.9   R Median - APB 30.1 <=31.5 4.5 25.6   R Ulnar - ADM 26.1 <=30.9 2.5 23.6       EMG Summary Table     Spontaneous Recruitment MUAP   Muscle Nerve Roots IA Fib PSW Fasc Other Pattern Amp Dur. PPP   R. Tibialis anterior Deep peroneal (Fibular) L4-L5 1+ None None None N N N N N   R. Gastrocnemius Tibial S1-S2 1+ None None None N N N N N   R. First dorsal interosseous Ulnar C8-T1 N None None None N N N N N   R. L5 paraspinal Spinal L5- N None None None N       R. S1 paraspinal Spinal S1- N None 1+ None N

## 2025-01-15 NOTE — PROCEDURES
Department of Neurology  Phone No: [phone], Fax: [phone]    Neurography & Electromyography Report    Full Name: Yenny Huertas Gender: Female  Patient ID: [id number] YOB: 1974      Visit Date: 1/14/2025 2:57 PM  Age: 50 Years  Examining Physician: Home Montesinos MD  Height: 5 feet 4 inch  Weight: 171 lbs    Yenny Huertas [id number] 1/14/2025 2:57 PM     Reason for Referral:    Concern for neuromuscular junction deficit or stiff person syndrome.    Relevant medical diagnoses:    Bilateral carpal tunnel syndrome.    Lumbar radiculopathy   Right Cubital tunnel syndrome  Multi regional back pain and fibromyalgia    Surgical procedures:    Neck surgery    Yenny Huertas [id number] 1/14/2025 2:57 PM     History and Examination:    50-year-old female with spasm and stiffness in her body since 2009.  On examination, impaired pinprick in upper part of the body but normal strength, sensation and deep tendon reflexes.      Technical Difficulties:    None.      Interpretation:     Abnormal study.  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  There is no evidence of neuromuscular junction deficit (e. g Myasthenia gravis) on repetitive stimulation.  Additionally, there is no evidence of simultaneous activation of agonist and antagonist muscles (stiff person syndrome) with dual channel recording.     Please correlate clinically.    Home Montesinos MD, FRCPE, FCPS, CHCQM  Neuromuscular Consultant  [facility]    Yneny Huertas [id number] 1/14/2025 2:57 PM              Sensory NCS      Nerve / Sites Rec. Site Segments Onset Lat Peak Lat Onset Angel Temp. Amp Distance      ms ms m/s °C µV mm   L Median - Dig II (Antidromic)      Wrist Index Wrist - Index 2.29 2.97 61.1 33.6 34.2 140      Ref.  Ref. <=3.30 <=4.00   >=7.0    R Median - Dig II (Antidromic)      Wrist Index Wrist     Health Maintenance       Hepatitis C Screening (Once)  Never done    Diabetes Foot Exam (Yearly)  Overdue since 1/11/2023    DTaP/Tdap/Td Vaccine (2 - Td or Tdap)  Overdue since 7/2/2023    COVID-19 Vaccine (6 - 2023-24 season)  Overdue since 9/1/2023           Following review of the above:  Endocrine topics reviewed today.   Note: Refer to final orders and clinician documentation.         - Index 4.32 5.94 32.4 32.4 3.2 140      Ref.  Ref. <=3.30 <=4.00   >=7.0    L Ulnar - Dig V (Antidromic)      Wrist Dig V Wrist - Dig V 2.45 3.13 49.0 34.4 19.4 120      Ref.  Ref. <=3.10 <=4.00   >=5.0    R Ulnar - Dig V (Antidromic)      Wrist Dig V Wrist - Dig V 2.03 2.66 54.2 32.4 13.4 110      Ref.  Ref. <=3.10 <=4.00   >=5.0    L Radial - Superficial (Antidromic)      Forearm Wrist Forearm - Wrist 1.30 2.03 76.8 34.9 25.5 100      Ref.  Ref. <=2.20 <=2.80   >=7.0    L Sural - (Antidromic)      Calf Ankle Calf - Ankle 3.65 4.27 38.4 31.9 5.3 140      Ref.  Ref. <=3.60 <=4.50   >=4.0    R Sural - (Antidromic)      Calf Ankle Calf - Ankle 2.81 3.65 49.8 31.1 5.0 140      Ref.  Ref. <=3.60 <=4.50   >=4.0        Motor NCS      Nerve / Sites Muscle Segments Latency Ref. Velocity Ref. Amplitude Ref. Temp. Dur. Distance      ms ms m/s m/s mV mV °C ms mm   L Median - APB      Wrist APB Wrist - APB 2.98 <=4.40   6.6 >=4.2 35.2 7.7 80      Elbow APB Elbow - Wrist 6.65  56 >=51 5.7  35 7.6 205   R Median - APB      Wrist APB Wrist - APB 7.60 <=4.40   0.9 >=4.2 32.7 5.3 80      Elbow APB Elbow - Wrist 11.50  51 >=51 0.9  32.7 5.7 200   L Ulnar - ADM      Wrist ADM Wrist - ADM 2.44 <=3.70   9.6 >=7.9 34.9 6.7 80      B.Elbow ADM B.Elbow - Wrist 5.65  61 >=52 9.7  34.9 6.1 195      A.Elbow ADM A.Elbow - B.Elbow 7.21  61 >=43 9.1  34.8 6.4 95     A.Elbow - Wrist       34.8     R Ulnar - ADM      Wrist ADM Wrist - ADM 2.27 <=3.70   8.3 >=7.9 32.2 4.9 80      B.Elbow ADM B.Elbow - Wrist 5.42  67 >=52 8.2  32.4 5.1 210      A.Elbow ADM A.Elbow - B.Elbow 7.06  61 >=43 8.0  32.2 5.4 100     A.Elbow - Wrist       32.2     L Peroneal - EDB      Ankle EDB Ankle - EDB 3.38 <=6.50   1.7 >=1.1 31.3 6.4 80      B. Fib Head EDB B. Fib Head - Ankle 10.63  43 >=39 1.5  31.2 6.6 310      A. Fib Head EDB A. Fib Head - B. Fib Head 12.75  42 >=42 1.5  31.3 6.4 90   R Peroneal - EDB      Ankle EDB Ankle - EDB 4.38 <=6.50   2.7 >=1.1 31.1 6.3 80       B. Fib Head EDB B. Fib Head - Ankle 11.25  45 >=39 2.8  31.1 6.5 310      A. Fib Head EDB A. Fib Head - B. Fib Head 13.35  43 >=42 2.6  31.1 6.6 90   L Tibial - AH      Ankle AH Ankle - AH 4.04 <=6.10   5.3 >=5.3 31.2 5.4 80   R Tibial - AH      Ankle AH Ankle - AH 3.88 <=6.10   10.8 >=5.3 31.1 5.9 80       F  Wave      Nerve F Latency Ref. M Latency F - M Lat    ms ms ms ms   L Median - APB 28.5 <=30.3 3.6 24.9   L Ulnar - ADM 26.1 <=30.9 2.0 24.1   L Peroneal - EDB 45.7 <=58.8 4.1 41.6   L Tibial - AH 48.2 <=57.5 4.7 43.4   R Peroneal - EDB 47.3 <=58.8 5.1 42.2   R Tibial - AH 51.0 <=57.5 4.4 46.6   R Median - APB 29.7 <=30.3 8.2 21.5   R Ulnar - ADM 26.1 <=30.9 2.6 23.5       Repetitive Nerve Stimulation (RNS)      Anatomy / Train Rate Amplitude ? Amp 4-1 Fac Ampl Area ?Area 4-1 Fac Area    Hz mV % % mVms % %   R Ulnar - ADM   Baseline @ 1Hz 1 7.5 0.1 100 23.1 2.6 100   Baseline @ 3Hz 3 7.4 -1 99 22.4 5.1 97.1   Post Exercise 3 7.9 0.2 106 22.5 17.6 97.5   @ 0:30 3 7.9 -2.1 106 23.7 2.7 103   @ 1:00 3 7.8 -2 105 23.6 4.8 102   @ 2:00 3 7.7 -3.4 103 22.1 2.8 95.8   @ 3:00 3 7.4 -4.4 99.3 20.7 0.2 89.6       EMG Summary Table     Spontaneous Recruitment MUAP   Muscle Nerve Roots IA Fib PSW Fasc Other Pattern Amp Dur. PPP   R. First dorsal interosseous Ulnar C8-T1 N None None None N N N N N   R. Abductor pollicis brevis Median C8-T1 N 1+ 2+ None N Reduced N 1+ 2+           Rep Stim: R Ulnar - ADM

## 2025-01-16 ENCOUNTER — PATIENT MESSAGE (OUTPATIENT)
Facility: CLINIC | Age: 55
End: 2025-01-16
Payer: MEDICAID

## 2025-02-21 ENCOUNTER — TELEPHONE (OUTPATIENT)
Dept: GENETICS | Facility: CLINIC | Age: 55
End: 2025-02-21
Payer: MEDICAID

## 2025-02-21 NOTE — TELEPHONE ENCOUNTER
----- Message from Chasity Buck sent at 2/21/2025  2:05 PM CST -----  Sounds like a plan  ----- Message -----  From: Padmini Larkin  Sent: 2/21/2025   1:03 PM CST  To: Chasity Buck CGC    Nvm just spoke to Dr. Ortega on the phone will try and get daughter in first  ----- Message -----  From: Padmini Larkin  Sent: 2/21/2025  10:31 AM CST  To: TALA Rangel so who's affected? I can get him in with Dr. Law on 3/3 or 3/24 but she does not like to double book so it would have to be 1 patient she sees... [facility] isn't open yet  ----- Message -----  From: Chasity Buck CGC  Sent: 2/21/2025   8:18 AM CST  To: Padmini Larkin    Do you mind calling him to reschedule in person and add on his kids Cinthia and Jonh. It's preferred that his kids get scheduled with him since they are more affected.  ----- Message -----  From: Vanessa Ortega MD  Sent: 2/20/2025   7:19 PM CST  To: TALA Rangel,     Did we ever see this patients affected family member? I thought the plan was to see his daughter first.    MA

## 2025-03-05 ENCOUNTER — TELEPHONE (OUTPATIENT)
Dept: GENETICS | Facility: CLINIC | Age: 55
End: 2025-03-05
Payer: MEDICAID

## 2025-03-31 ENCOUNTER — TELEPHONE (OUTPATIENT)
Dept: GENETICS | Facility: CLINIC | Age: 55
End: 2025-03-31
Payer: MEDICAID

## 2025-04-01 ENCOUNTER — LAB VISIT (OUTPATIENT)
Dept: LAB | Facility: HOSPITAL | Age: 55
End: 2025-04-01
Payer: MEDICAID

## 2025-04-01 ENCOUNTER — OFFICE VISIT (OUTPATIENT)
Dept: GENETICS | Facility: CLINIC | Age: 55
End: 2025-04-01
Payer: MEDICAID

## 2025-04-01 VITALS
BODY MASS INDEX: 26.97 KG/M2 | SYSTOLIC BLOOD PRESSURE: 126 MMHG | HEIGHT: 69 IN | WEIGHT: 182.13 LBS | DIASTOLIC BLOOD PRESSURE: 68 MMHG | HEART RATE: 94 BPM

## 2025-04-01 DIAGNOSIS — G62.9 NEUROPATHY: Primary | ICD-10-CM

## 2025-04-01 DIAGNOSIS — L40.9 PSORIASIS: ICD-10-CM

## 2025-04-01 DIAGNOSIS — G62.9 NEUROPATHY: ICD-10-CM

## 2025-04-01 LAB
ALBUMIN SERPL BCP-MCNC: 3.9 G/DL (ref 3.5–5.2)
ALP SERPL-CCNC: 70 UNIT/L (ref 40–150)
ALT SERPL W/O P-5'-P-CCNC: 17 UNIT/L (ref 10–44)
ANION GAP (OHS): 8 MMOL/L (ref 8–16)
AST SERPL-CCNC: 17 UNIT/L (ref 11–45)
BILIRUB SERPL-MCNC: 0.4 MG/DL (ref 0.1–1)
BUN SERPL-MCNC: 20 MG/DL (ref 6–20)
CALCIUM SERPL-MCNC: 9.1 MG/DL (ref 8.7–10.5)
CHLORIDE SERPL-SCNC: 106 MMOL/L (ref 95–110)
CO2 SERPL-SCNC: 23 MMOL/L (ref 23–29)
CREAT SERPL-MCNC: 0.8 MG/DL (ref 0.5–1.4)
GFR SERPLBLD CREATININE-BSD FMLA CKD-EPI: >60 ML/MIN/1.73/M2
GLUCOSE SERPL-MCNC: 110 MG/DL (ref 70–110)
POTASSIUM SERPL-SCNC: 4.2 MMOL/L (ref 3.5–5.1)
PROT SERPL-MCNC: 6.7 GM/DL (ref 6–8.4)
SODIUM SERPL-SCNC: 137 MMOL/L (ref 136–145)

## 2025-04-01 PROCEDURE — 82140 ASSAY OF AMMONIA: CPT

## 2025-04-01 PROCEDURE — 82139 AMINO ACIDS QUAN 6 OR MORE: CPT

## 2025-04-01 PROCEDURE — 80053 COMPREHEN METABOLIC PANEL: CPT

## 2025-04-01 PROCEDURE — 99999 PR PBB SHADOW E&M-EST. PATIENT-LVL IV: CPT | Mod: PBBFAC,,,

## 2025-04-01 PROCEDURE — 82542 COL CHROMOTOGRAPHY QUAL/QUAN: CPT

## 2025-04-01 PROCEDURE — 36415 COLL VENOUS BLD VENIPUNCTURE: CPT

## 2025-04-01 PROCEDURE — 99214 OFFICE O/P EST MOD 30 MIN: CPT | Mod: PBBFAC

## 2025-04-01 PROCEDURE — 83919 ORGANIC ACIDS QUAL EACH: CPT
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
## 2025-04-02 ENCOUNTER — PATIENT MESSAGE (OUTPATIENT)
Dept: GENETICS | Facility: CLINIC | Age: 55
End: 2025-04-02
Payer: MEDICAID

## 2025-04-02 LAB — AMMONIA PLAS-SCNC: 42 UMOL/L (ref 10–50)

## 2025-04-02 PROCEDURE — 83864 MUCOPOLYSACCHARIDES: CPT

## 2025-04-02 PROCEDURE — 84275 ASSAY OF SIALIC ACID: CPT

## 2025-04-02 PROCEDURE — 83789 MASS SPECTROMETRY QUAL/QUAN: CPT

## 2025-04-02 PROCEDURE — 84377 SUGARS MULTIPLE QUAL: CPT

## 2025-04-02 PROCEDURE — 30000890 HC MISC. SEND OUT TEST

## 2025-04-04 LAB
3-OH-DECENOYLCARN SERPL-SCNC: 0.02 NMOL/ML
3-OH-DODECENOYLCARN SERPL-SCNC: 0.03 NMOL/ML
3OH-BUTYRCARN SERPL-SCNC: 0.04 NMOL/ML
3OH-DODECANOYLCARN SERPL-SCNC: 0.01 NMOL/ML
3OH-HEXANOYLCARN SERPL-SCNC: 0.02 NMOL/ML
3OH-ISOVALERYLCARN SERPL-SCNC: 0.02 NMOL/ML
3OH-LINOLEOYLCARN SERPL-SCNC: <0.02 NMOL/ML
3OH-OLEOYLCARN SERPL-SCNC: 0.01 NMOL/ML
3OH-PALMITOLEYLCARN SERPL-SCNC: 0.01 NMOL/ML
3OH-PALMITOYLCARN SERPL-SCNC: 0.01 NMOL/ML
3OH-STEAROYLCARN SERPL-SCNC: 0.01 NMOL/ML
3OH-TDECANOYLCARN SERPL-SCNC: 0.01 NMOL/ML
3OH-TDECENOYLCARN SERPL-SCNC: 0.02 NMOL/ML
ACETYLCARN SERPL-SCNC: 7.76 NMOL/ML (ref 2–17.83)
ACRYLYLCARN SERPL-SCNC: <0.02 NMOL/ML
ADIPOYLCARN SERPL-SCNC: 0.03 NMOL/ML
ANNOTATION COMMENT IMP: NORMAL
BENZOYLCARN SERPL-SCNC: 0.01 NMOL/ML
DECADIENOYLCARN SERPL-SCNC: <0.05 NMOL/ML
DECANOYLCARN SERPL-SCNC: 0.19 NMOL/ML
DECENOYLCARN SERPL-SCNC: 0.12 NMOL/ML
DICARBOXYDODECANOYLCARN SERPL-SCNC: 0 NMOL/ML
DODECANOYLCARN SERPL-SCNC: 0.08 NMOL/ML
DODECENOYLCARN SERPL-SCNC: 0.05 NMOL/ML
FORMIMINOGLUTAMATE SERPL-SCNC: <0.01 NMOL/ML
GLUTARYLCARN SERPL-SCNC: 0.06 NMOL/ML
HEPTANOYLCARN SERPL-SCNC: 0.01 NMOL/ML
HEXANOYLCARN SERPL-SCNC: 0.05 NMOL/ML
HEXENOYLCARN SERPL-SCNC: 0.01 NMOL/ML
ISOBUTYRYLCARN SERPL-SCNC: 0.23 NMOL/ML
ISOVALERYL+MEBUTYRYLCARN SERPL-SCNC: 0.17 NMOL/ML
LINOLEOYLCARN SERPL-SCNC: 0.03 NMOL/ML
MALONYLCARN SERPL-SCNC: 0.09 NMOL/ML
ME-MALONYLCARN+SUCCINYLCARN SERPL-SCNC: 0.03 NMOL/ML
OCTANOYLCARN SERPL-SCNC: 0.18 NMOL/ML
OCTENOYLCARN SERPL-SCNC: 0.29 NMOL/ML
OLEOYLCARN SERPL-SCNC: 0.07 NMOL/ML
ORGANIC ACIDS PATTERN UR-IMP: NORMAL
PALMITOLEYLCARN SERPL-SCNC: 0.02 NMOL/ML
PALMITOYLCARN SERPL-SCNC: 0.08 NMOL/ML
PHENYLACETYLCARN SERPL-SCNC: 0.04 NMOL/ML
PROPIONYLCARN SERPL-SCNC: 0.49 NMOL/ML
SALICYLCARNITINE SERPL-SCNC: <0.05 NMOL/ML
STEAROYLCARN SERPL-SCNC: 0.03 NMOL/ML
SUBERYLCARN SERPL-SCNC: 0.03 NMOL/ML
TDECADIENOYLCARN SERPL-SCNC: 0.03 NMOL/ML
TDECANOYLCARN SERPL-SCNC: 0.07 NMOL/ML
TDECENOYLCARN SERPL-SCNC: 0.05 NMOL/ML
TIGLYLCARN SERPL-SCNC: 0.02 NMOL/ML

## 2025-04-23 ENCOUNTER — TELEPHONE (OUTPATIENT)
Dept: GENETICS | Facility: CLINIC | Age: 55
End: 2025-04-23
Payer: MEDICAID

## 2025-04-23 NOTE — TELEPHONE ENCOUNTER
Attempted to contact patient to discuss genetic test results. Call disconnected on first attempt and left VM on second attempt. Cmune message also sent with results.

## 2025-05-14 DIAGNOSIS — R41.3 MEMORY CHANGES: Primary | ICD-10-CM

## 2025-05-14 DIAGNOSIS — R25.1 TREMOR: ICD-10-CM

## 2025-05-20 ENCOUNTER — HOSPITAL ENCOUNTER (OUTPATIENT)
Dept: RADIOLOGY | Facility: HOSPITAL | Age: 55
Discharge: HOME | End: 2025-05-20
Attending: NURSE PRACTITIONER
Payer: MEDICAID

## 2025-05-20 DIAGNOSIS — R41.3 MEMORY CHANGES: ICD-10-CM

## 2025-05-20 DIAGNOSIS — R25.1 TREMOR: ICD-10-CM

## 2025-05-20 PROCEDURE — 70551 MRI BRAIN STEM W/O DYE: CPT | Mod: TC,PO

## 2025-05-20 PROCEDURE — 70551 MRI BRAIN STEM W/O DYE: CPT | Mod: 26,,, | Performed by: RADIOLOGY

## 2025-05-29 ENCOUNTER — HOSPITAL ENCOUNTER (EMERGENCY)
Facility: HOSPITAL | Age: 55
Discharge: HOME | End: 2025-05-29
Attending: EMERGENCY MEDICINE
Payer: MEDICAID

## 2025-05-29 VITALS
RESPIRATION RATE: 16 BRPM | OXYGEN SATURATION: 99 % | SYSTOLIC BLOOD PRESSURE: 135 MMHG | HEIGHT: 69 IN | BODY MASS INDEX: 27.55 KG/M2 | HEART RATE: 101 BPM | DIASTOLIC BLOOD PRESSURE: 74 MMHG | TEMPERATURE: 98 F | WEIGHT: 186 LBS

## 2025-05-29 DIAGNOSIS — S05.01XA ABRASION OF RIGHT CORNEA, INITIAL ENCOUNTER: Primary | ICD-10-CM

## 2025-05-29 LAB
HCV AB SERPL QL IA: NORMAL
HIV 1+2 AB+HIV1 P24 AG SERPL QL IA: NORMAL
HOLD SPECIMEN: NORMAL

## 2025-05-29 PROCEDURE — 87389 HIV-1 AG W/HIV-1&-2 AB AG IA: CPT | Performed by: EMERGENCY MEDICINE

## 2025-05-29 PROCEDURE — 25000003 PHARM REV CODE 250: Performed by: EMERGENCY MEDICINE

## 2025-05-29 PROCEDURE — 99284 EMERGENCY DEPT VISIT MOD MDM: CPT

## 2025-05-29 PROCEDURE — 36415 COLL VENOUS BLD VENIPUNCTURE: CPT | Performed by: EMERGENCY MEDICINE

## 2025-05-29 PROCEDURE — 86803 HEPATITIS C AB TEST: CPT | Performed by: EMERGENCY MEDICINE

## 2025-05-29 PROCEDURE — 25000003 PHARM REV CODE 250: Performed by: PHYSICIAN ASSISTANT

## 2025-05-29 RX ORDER — OFLOXACIN 3 MG/ML
1 SOLUTION/ DROPS OPHTHALMIC 4 TIMES DAILY
Qty: 10 ML | Refills: 0 | Status: SHIPPED | OUTPATIENT
Start: 2025-05-29

## 2025-05-29 RX ORDER — PROPARACAINE HYDROCHLORIDE 5 MG/ML
1 SOLUTION/ DROPS OPHTHALMIC
Status: COMPLETED | OUTPATIENT
Start: 2025-05-29 | End: 2025-05-29

## 2025-05-29 RX ORDER — HYDROCODONE BITARTRATE AND ACETAMINOPHEN 5; 325 MG/1; MG/1
1 TABLET ORAL EVERY 6 HOURS PRN
Qty: 12 TABLET | Refills: 0 | Status: SHIPPED | OUTPATIENT
Start: 2025-05-29

## 2025-05-29 RX ADMIN — FLUORESCEIN SODIUM 1 EACH: 1 STRIP OPHTHALMIC at 04:05

## 2025-05-29 RX ADMIN — Medication 1 CONTAINER: at 05:05

## 2025-05-29 RX ADMIN — PROPARACAINE HYDROCHLORIDE 1 DROP: 5 SOLUTION/ DROPS OPHTHALMIC at 04:05

## 2025-06-26 ENCOUNTER — PATIENT MESSAGE (OUTPATIENT)
Dept: GENETICS | Facility: CLINIC | Age: 55
End: 2025-06-26
Payer: MEDICAID

## 2025-07-17 ENCOUNTER — LAB VISIT (OUTPATIENT)
Dept: LAB | Facility: HOSPITAL | Age: 55
End: 2025-07-17
Attending: NURSE PRACTITIONER
Payer: MEDICAID

## 2025-07-17 DIAGNOSIS — R25.1 DYSPHONIA OF ORGANIC TREMOR: ICD-10-CM

## 2025-07-17 DIAGNOSIS — R41.3 MEMORY LOSS: Primary | ICD-10-CM

## 2025-07-17 DIAGNOSIS — R49.0 DYSPHONIA OF ORGANIC TREMOR: ICD-10-CM

## 2025-07-17 LAB
25(OH)D3+25(OH)D2 SERPL-MCNC: 32 NG/ML (ref 30–96)
ALBUMIN SERPL-MCNC: 4.4 G/DL (ref 3.5–5.2)
ALP SERPL-CCNC: 58 UNIT/L (ref 55–135)
ALT SERPL-CCNC: 17 UNIT/L (ref 10–44)
AMMONIA PLAS-SCNC: 31 UMOL/L (ref 10–50)
ANION GAP (SMH): 4 MMOL/L (ref 8–16)
AST SERPL-CCNC: 16 UNIT/L (ref 10–40)
BILIRUB SERPL-MCNC: 0.3 MG/DL (ref 0.1–1)
BUN SERPL-MCNC: 19 MG/DL (ref 6–20)
C-REACTIVE PROTEIN (SMH): 0.2 MG/DL
CALCIUM SERPL-MCNC: 9.4 MG/DL (ref 8.7–10.5)
CHLORIDE SERPL-SCNC: 103 MMOL/L (ref 95–110)
CO2 SERPL-SCNC: 32 MMOL/L (ref 23–29)
CREAT SERPL-MCNC: 0.8 MG/DL (ref 0.5–1.4)
ERYTHROCYTE [SEDIMENTATION RATE] IN BLOOD: 5 MM/HR (ref 0–10)
FOLATE SERPL-MCNC: 5.2 NG/ML (ref 4–24)
GFR SERPLBLD CREATININE-BSD FMLA CKD-EPI: >60 ML/MIN/1.73/M2
GLUCOSE SERPL-MCNC: 95 MG/DL (ref 70–110)
POTASSIUM SERPL-SCNC: 4.8 MMOL/L (ref 3.5–5.1)
PROT SERPL-MCNC: 6.7 GM/DL (ref 6–8.4)
SODIUM SERPL-SCNC: 139 MMOL/L (ref 136–145)
TSH SERPL-ACNC: 2.55 UIU/ML (ref 0.34–5.6)
VIT B12 SERPL-MCNC: 420 PG/ML (ref 210–950)

## 2025-07-17 PROCEDURE — 85651 RBC SED RATE NONAUTOMATED: CPT

## 2025-07-17 PROCEDURE — 82525 ASSAY OF COPPER: CPT

## 2025-07-17 PROCEDURE — 83520 IMMUNOASSAY QUANT NOS NONAB: CPT

## 2025-07-17 PROCEDURE — 82390 ASSAY OF CERULOPLASMIN: CPT

## 2025-07-17 PROCEDURE — 86140 C-REACTIVE PROTEIN: CPT

## 2025-07-17 PROCEDURE — 82140 ASSAY OF AMMONIA: CPT

## 2025-07-17 PROCEDURE — 82746 ASSAY OF FOLIC ACID SERUM: CPT

## 2025-07-17 PROCEDURE — 86592 SYPHILIS TEST NON-TREP QUAL: CPT

## 2025-07-17 PROCEDURE — 82607 VITAMIN B-12: CPT

## 2025-07-17 PROCEDURE — 84443 ASSAY THYROID STIM HORMONE: CPT

## 2025-07-17 PROCEDURE — 82306 VITAMIN D 25 HYDROXY: CPT

## 2025-07-17 PROCEDURE — 82040 ASSAY OF SERUM ALBUMIN: CPT

## 2025-07-17 PROCEDURE — 36415 COLL VENOUS BLD VENIPUNCTURE: CPT

## 2025-07-17 PROCEDURE — 84425 ASSAY OF VITAMIN B-1: CPT

## 2025-07-18 LAB — RPR SER QL: NORMAL

## 2025-07-19 LAB — CERULOPLASMIN SERPL-MCNC: 20.2 MG/DL (ref 16–31)

## 2025-07-22 LAB
COPPER SERPL-MCNC: 89 UG/DL (ref 69–132)
VIT B1 BLD-SCNC: 91 NMOL/L (ref 66.5–200)